# Patient Record
Sex: MALE | Race: WHITE | Employment: OTHER
[De-identification: names, ages, dates, MRNs, and addresses within clinical notes are randomized per-mention and may not be internally consistent; named-entity substitution may affect disease eponyms.]

---

## 2022-07-28 ENCOUNTER — OFFICE VISIT (OUTPATIENT)
Dept: FAMILY MEDICINE CLINIC | Facility: CLINIC | Age: 62
End: 2022-07-28
Payer: OTHER GOVERNMENT

## 2022-07-28 ENCOUNTER — PATIENT MESSAGE (OUTPATIENT)
Dept: FAMILY MEDICINE CLINIC | Facility: CLINIC | Age: 62
End: 2022-07-28

## 2022-07-28 VITALS
DIASTOLIC BLOOD PRESSURE: 70 MMHG | HEIGHT: 73 IN | TEMPERATURE: 98.9 F | WEIGHT: 228 LBS | BODY MASS INDEX: 30.22 KG/M2 | OXYGEN SATURATION: 97 % | SYSTOLIC BLOOD PRESSURE: 130 MMHG | HEART RATE: 73 BPM

## 2022-07-28 DIAGNOSIS — Z79.899 MEDICATION MANAGEMENT: Primary | ICD-10-CM

## 2022-07-28 DIAGNOSIS — Z23 ENCOUNTER FOR HERPES ZOSTER VACCINATION: ICD-10-CM

## 2022-07-28 DIAGNOSIS — Z00.00 ENCNTR FOR GENERAL ADULT MEDICAL EXAM W/O ABNORMAL FINDINGS: ICD-10-CM

## 2022-07-28 DIAGNOSIS — J44.9 CHRONIC OBSTRUCTIVE PULMONARY DISEASE, UNSPECIFIED COPD TYPE (HCC): ICD-10-CM

## 2022-07-28 DIAGNOSIS — M43.10 SPONDYLOLISTHESIS, UNSPECIFIED SPINAL REGION: Primary | ICD-10-CM

## 2022-07-28 DIAGNOSIS — M62.838 MUSCLE SPASM: ICD-10-CM

## 2022-07-28 PROBLEM — M17.11 OSTEOARTHRITIS OF RIGHT KNEE: Status: ACTIVE | Noted: 2021-11-30

## 2022-07-28 PROBLEM — N41.0 PROSTATITIS, ACUTE: Status: ACTIVE | Noted: 2018-07-06

## 2022-07-28 PROBLEM — M25.522 LEFT ELBOW PAIN: Status: ACTIVE | Noted: 2018-09-20

## 2022-07-28 PROBLEM — M79.672 LEFT FOOT PAIN: Status: ACTIVE | Noted: 2018-09-20

## 2022-07-28 PROBLEM — G89.29 CHRONIC LOW BACK PAIN WITHOUT SCIATICA: Status: ACTIVE | Noted: 2018-09-20

## 2022-07-28 PROBLEM — M54.50 CHRONIC LOW BACK PAIN WITHOUT SCIATICA: Status: ACTIVE | Noted: 2018-09-20

## 2022-07-28 PROBLEM — R73.03 PREDIABETES: Status: ACTIVE | Noted: 2019-09-27

## 2022-07-28 PROBLEM — D36.13 NEUROMA OF FOOT: Status: ACTIVE | Noted: 2021-11-21

## 2022-07-28 LAB
ALBUMIN SERPL-MCNC: 4 G/DL (ref 3.2–4.6)
ALBUMIN/GLOB SERPL: 1.1 {RATIO} (ref 1.2–3.5)
ALP SERPL-CCNC: 62 U/L (ref 50–136)
ALT SERPL-CCNC: 31 U/L (ref 12–65)
ANION GAP SERPL CALC-SCNC: 7 MMOL/L (ref 7–16)
AST SERPL-CCNC: 21 U/L (ref 15–37)
BASOPHILS # BLD: 0.1 K/UL (ref 0–0.2)
BASOPHILS NFR BLD: 1 % (ref 0–2)
BILIRUB SERPL-MCNC: 0.3 MG/DL (ref 0.2–1.1)
BUN SERPL-MCNC: 24 MG/DL (ref 8–23)
CALCIUM SERPL-MCNC: 9.3 MG/DL (ref 8.3–10.4)
CHLORIDE SERPL-SCNC: 111 MMOL/L (ref 98–107)
CO2 SERPL-SCNC: 25 MMOL/L (ref 21–32)
CREAT SERPL-MCNC: 1.1 MG/DL (ref 0.8–1.5)
DIFFERENTIAL METHOD BLD: ABNORMAL
EOSINOPHIL # BLD: 0.2 K/UL (ref 0–0.8)
EOSINOPHIL NFR BLD: 3 % (ref 0.5–7.8)
ERYTHROCYTE [DISTWIDTH] IN BLOOD BY AUTOMATED COUNT: 12.8 % (ref 11.9–14.6)
GLOBULIN SER CALC-MCNC: 3.5 G/DL (ref 2.3–3.5)
GLUCOSE SERPL-MCNC: 106 MG/DL (ref 65–100)
HCT VFR BLD AUTO: 41.4 % (ref 41.1–50.3)
HGB BLD-MCNC: 13.4 G/DL (ref 13.6–17.2)
IMM GRANULOCYTES # BLD AUTO: 0 K/UL (ref 0–0.5)
IMM GRANULOCYTES NFR BLD AUTO: 0 % (ref 0–5)
LYMPHOCYTES # BLD: 1.5 K/UL (ref 0.5–4.6)
LYMPHOCYTES NFR BLD: 22 % (ref 13–44)
MCH RBC QN AUTO: 32.4 PG (ref 26.1–32.9)
MCHC RBC AUTO-ENTMCNC: 32.4 G/DL (ref 31.4–35)
MCV RBC AUTO: 100.2 FL (ref 79.6–97.8)
MONOCYTES # BLD: 0.5 K/UL (ref 0.1–1.3)
MONOCYTES NFR BLD: 8 % (ref 4–12)
NEUTS SEG # BLD: 4.5 K/UL (ref 1.7–8.2)
NEUTS SEG NFR BLD: 66 % (ref 43–78)
NRBC # BLD: 0 K/UL (ref 0–0.2)
PLATELET # BLD AUTO: 202 K/UL (ref 150–450)
PMV BLD AUTO: 10.8 FL (ref 9.4–12.3)
POTASSIUM SERPL-SCNC: 3.9 MMOL/L (ref 3.5–5.1)
PROT SERPL-MCNC: 7.5 G/DL (ref 6.3–8.2)
RBC # BLD AUTO: 4.13 M/UL (ref 4.23–5.6)
SODIUM SERPL-SCNC: 143 MMOL/L (ref 136–145)
WBC # BLD AUTO: 6.8 K/UL (ref 4.3–11.1)

## 2022-07-28 PROCEDURE — 99386 PREV VISIT NEW AGE 40-64: CPT | Performed by: NURSE PRACTITIONER

## 2022-07-28 RX ORDER — CYCLOBENZAPRINE HCL 10 MG
10 TABLET ORAL 2 TIMES DAILY PRN
Qty: 14 TABLET | Refills: 0 | Status: SHIPPED | OUTPATIENT
Start: 2022-07-28

## 2022-07-28 RX ORDER — IBUPROFEN 800 MG/1
800 TABLET ORAL EVERY 8 HOURS PRN
Qty: 90 TABLET | Refills: 0 | Status: SHIPPED | COMMUNITY
Start: 2022-07-28 | End: 2022-07-29 | Stop reason: SDUPTHER

## 2022-07-28 RX ORDER — ALBUTEROL SULFATE 90 UG/1
2 AEROSOL, METERED RESPIRATORY (INHALATION) EVERY 6 HOURS PRN
Qty: 18 G | Refills: 2 | Status: SHIPPED | OUTPATIENT
Start: 2022-07-28

## 2022-07-28 RX ORDER — ALBUTEROL SULFATE 90 UG/1
2 AEROSOL, METERED RESPIRATORY (INHALATION) EVERY 6 HOURS PRN
COMMUNITY
Start: 2021-11-19 | End: 2022-07-28 | Stop reason: SDUPTHER

## 2022-07-28 RX ORDER — IBUPROFEN 800 MG/1
800 TABLET ORAL EVERY 8 HOURS PRN
COMMUNITY
Start: 2021-12-15 | End: 2022-07-28 | Stop reason: DRUGHIGH

## 2022-07-28 RX ORDER — CYCLOBENZAPRINE HCL 10 MG
10 TABLET ORAL 2 TIMES DAILY PRN
COMMUNITY
Start: 2021-11-11 | End: 2022-07-28 | Stop reason: SDUPTHER

## 2022-07-28 RX ORDER — ZOSTER VACCINE RECOMBINANT, ADJUVANTED 50 MCG/0.5
0.5 KIT INTRAMUSCULAR SEE ADMIN INSTRUCTIONS
Qty: 0.5 ML | Refills: 0 | Status: SHIPPED | OUTPATIENT
Start: 2022-07-28 | End: 2023-01-24

## 2022-07-28 SDOH — ECONOMIC STABILITY: FOOD INSECURITY: WITHIN THE PAST 12 MONTHS, THE FOOD YOU BOUGHT JUST DIDN'T LAST AND YOU DIDN'T HAVE MONEY TO GET MORE.: NEVER TRUE

## 2022-07-28 SDOH — ECONOMIC STABILITY: TRANSPORTATION INSECURITY
IN THE PAST 12 MONTHS, HAS THE LACK OF TRANSPORTATION KEPT YOU FROM MEDICAL APPOINTMENTS OR FROM GETTING MEDICATIONS?: NO

## 2022-07-28 SDOH — ECONOMIC STABILITY: FOOD INSECURITY: WITHIN THE PAST 12 MONTHS, YOU WORRIED THAT YOUR FOOD WOULD RUN OUT BEFORE YOU GOT MONEY TO BUY MORE.: NEVER TRUE

## 2022-07-28 SDOH — ECONOMIC STABILITY: TRANSPORTATION INSECURITY
IN THE PAST 12 MONTHS, HAS LACK OF TRANSPORTATION KEPT YOU FROM MEETINGS, WORK, OR FROM GETTING THINGS NEEDED FOR DAILY LIVING?: NO

## 2022-07-28 ASSESSMENT — PATIENT HEALTH QUESTIONNAIRE - PHQ9
1. LITTLE INTEREST OR PLEASURE IN DOING THINGS: 0
SUM OF ALL RESPONSES TO PHQ QUESTIONS 1-9: 0
SUM OF ALL RESPONSES TO PHQ QUESTIONS 1-9: 0
SUM OF ALL RESPONSES TO PHQ9 QUESTIONS 1 & 2: 0
SUM OF ALL RESPONSES TO PHQ QUESTIONS 1-9: 0
SUM OF ALL RESPONSES TO PHQ QUESTIONS 1-9: 0
2. FEELING DOWN, DEPRESSED OR HOPELESS: 0

## 2022-07-28 ASSESSMENT — ENCOUNTER SYMPTOMS
VOMITING: 0
COUGH: 0
ABDOMINAL PAIN: 0
RHINORRHEA: 1
SHORTNESS OF BREATH: 1
CONSTIPATION: 0
WHEEZING: 0
NAUSEA: 0
EYES NEGATIVE: 1
BACK PAIN: 0
TROUBLE SWALLOWING: 0
SORE THROAT: 0
DIARRHEA: 0
BLOOD IN STOOL: 0

## 2022-07-28 ASSESSMENT — SOCIAL DETERMINANTS OF HEALTH (SDOH): HOW HARD IS IT FOR YOU TO PAY FOR THE VERY BASICS LIKE FOOD, HOUSING, MEDICAL CARE, AND HEATING?: NOT HARD AT ALL

## 2022-07-28 ASSESSMENT — LIFESTYLE VARIABLES
HOW MANY STANDARD DRINKS CONTAINING ALCOHOL DO YOU HAVE ON A TYPICAL DAY: 1 OR 2
HOW OFTEN DO YOU HAVE A DRINK CONTAINING ALCOHOL: 2-3 TIMES A WEEK

## 2022-07-28 NOTE — PROGRESS NOTES
Camron Mario is a 59 y.o. male who presents today for the following:  Chief Complaint   Patient presents with    New Patient    Establish Care       Allergies   Allergen Reactions    Penicillins Anaphylaxis     Childhood allergy per patient severe reaction  Childhood allergy per patient severe reaction         Current Outpatient Medications   Medication Sig Dispense Refill    zoster recombinant adjuvanted vaccine (SHINGRIX) 50 MCG/0.5ML SUSR injection Inject 0.5 mLs into the muscle See Admin Instructions 1 dose now for second dose. 0.5 mL 0    albuterol sulfate HFA (PROVENTIL;VENTOLIN;PROAIR) 108 (90 Base) MCG/ACT inhaler Inhale 2 puffs into the lungs every 6 hours as needed for Wheezing or Shortness of Breath 18 g 2    cyclobenzaprine (FLEXERIL) 10 MG tablet Take 1 tablet by mouth 2 times daily as needed for Muscle spasms 14 tablet 0    ibuprofen (ADVIL;MOTRIN) 800 MG tablet Take 1 tablet by mouth every 8 hours as needed for Pain Take sparingly and with food. 90 tablet 0     No current facility-administered medications for this visit.        Past Medical History:   Diagnosis Date    Benign prostatic hyperplasia with weak urinary stream     Chronic pain of left knee     Chronic pain of right knee     COPD (chronic obstructive pulmonary disease) (HCC)     Hearing loss     Kidney stone     Kidney stone     2018 passes spontaneously    PAC (premature atrial contraction)     Prostatitis     x2 episodes, previously evaluated by urology    PVC (premature ventricular contraction)     Tinnitus        Past Surgical History:   Procedure Laterality Date    COLONOSCOPY      JOINT REPLACEMENT Right     November 2021    SINUS ENDOSCOPY      SPINE SURGERY      2015 L5 and S1       Social History     Tobacco Use    Smoking status: Former     Years: 1.00     Types: Cigarettes    Smokeless tobacco: Never    Tobacco comments:     Quit smoking 40 years ago   Substance Use Topics    Alcohol use: Yes     Comment: 2-3 times a week 2 drinks        Family History   Problem Relation Age of Onset    Cancer Mother         gyn cancer    No Known Problems Father     No Known Problems Sister     No Known Problems Brother        HPI   Patient presents new to me and the practice to establish care. He recently moved from [place], [place] accompanied by his wife to be closer to family. He is ex [employer] previously served in [employer]. He does report history of COPD and sinus problems as a result to inhaling sand. \"Lost 25% of lungs\". He does use inhaler occasionally and is on Advair prn. He does report history of prostatitis with enlarged prostate. He has hearing loss and tinnitus from the [employer]. He reports he had previously had a recent labs last year with PSA and lipids included closer to a year ago. He had total right knee replacement November 2021 with Dr. Bry Mora. He is currently seeing Dr. Beatriz Lara with [facility] in preparation for left knee surgery. He reports previous spinal surgery. His main concern is getting refills on his ibuprofen. He reports significant knee pain that is not responding to 400 to 600 mg ibuprofen, topical Voltaren, or Tylenol. He reports previous provider within [facility] recent labs close to a year. Review of Systems   Constitutional:  Negative for chills, diaphoresis, fatigue, fever and unexpected weight change. HENT:  Positive for hearing loss, rhinorrhea and tinnitus. Negative for dental problem, mouth sores, nosebleeds, sore throat and trouble swallowing. Constant light drainage. Eyes: Negative. Wears glasses, no contacts. Respiratory:  Positive for shortness of breath. Negative for cough and wheezing. Going up and downstairs. Cardiovascular:  Positive for palpitations. Negative for chest pain and leg swelling. PVC/PAC 1980s on holter monitor 2 weeks.    Gastrointestinal:  Negative for abdominal pain, blood in stool, Normal breath sounds. Abdominal:      General: Bowel sounds are normal. There is no distension. Palpations: Abdomen is soft. Tenderness: There is no abdominal tenderness. Musculoskeletal:         General: Normal range of motion. Cervical back: Normal range of motion and neck supple. No rigidity or tenderness. Right lower leg: No edema. Left lower leg: No edema. Lymphadenopathy:      Cervical: No cervical adenopathy. Skin:     General: Skin is warm and dry. Coloration: Skin is not jaundiced or pale. Neurological:      General: No focal deficit present. Mental Status: He is alert and oriented to person, place, and time. Psychiatric:         Mood and Affect: Mood normal.         Behavior: Behavior normal.         Thought Content: Thought content normal.         Judgment: Judgment normal.        1. Medication management  -     Comprehensive Metabolic Panel; Future  -     CBC with Auto Differential; Future  2. Encntr for general adult medical exam w/o abnormal findings  -     Comprehensive Metabolic Panel; Future  -     CBC with Auto Differential; Future  3. Encounter for herpes zoster vaccination  -     zoster recombinant adjuvanted vaccine [facility]) 50 MCG/0.5ML SUSR injection; Inject 0.5 mLs into the muscle See Admin Instructions 1 dose now for second dose., Disp-0.5 mL, R-0Normal  4. Chronic obstructive pulmonary disease, unspecified COPD type (HCC)  -     albuterol sulfate HFA (PROVENTIL;VENTOLIN;PROAIR) 108 (90 Base) MCG/ACT inhaler; Inhale 2 puffs into the lungs every 6 hours as needed for Wheezing or Shortness of Breath, Disp-18 g, R-2Normal  5. Muscle spasm  -     cyclobenzaprine (FLEXERIL) 10 MG tablet; Take 1 tablet by mouth 2 times daily as needed for Muscle spasms, Disp-14 tablet, R-0Normal   Health maintenance:  Denies previous HTN dx. Declines PSA and lipid as reports recently had completed.   Encouraged regular exercise, DASH diet, diet low in saturated fat/cholesterol, and vit d/calcium rich diet. Cologuard  6 months 2021 ago clear, colonoscopy at 48 clear. Need second shingles vaccine, sent to his pharmacy the Rx; declines COVID-19 and pneumonia vaccine. Declines Hep C and HIV screening. Pt drinks in moderation, encouraged to continue no more than 2 drinks daily/14 per week. He does not smoke  He is encouraged to maintain his follow up with dentist and ophthalmology. Old records requested for review. Discussed with patient risks of NSAID induced gastric ulcers with long-term use and recommend using ibuprofen sparingly and if takes regularly to consider PPI over-the-counter to take regularly. Discussed with patient also regularly use can affect the kidneys. We will continue to monitor. Discussed with patient recommend close monitoring for any bloody or black stools. Patient informed, we will call with blood work results within one week. If you have not heard regarding results in over a week, please contact office. You can also [facility]. Follow-up and Dispositions    Return in about 6 months (around 1/28/2023).      Claudell Echevaria, APRN - CNP

## 2022-07-28 NOTE — PATIENT INSTRUCTIONS
Patient Education        Learning About the Mediterranean Diet  What is the [address]? The Mediterranean diet is a style of eating rather than a diet plan. It features foods eaten in [place], [place], [place] and [place], and other countries along the [facility]. It emphasizes eating foods like fish, fruits, vegetables, beans, high-fiber breads and whole grains, nuts, and oliveoil. This style of eating includes limited red meat, cheese, and sweets. Why choose the Mediterranean diet? A Mediterranean-style diet may improve heart health. It contains more fat than other heart-healthy diets. But the fats are mainly from nuts, unsaturated oils (such as fish oils and olive oil), and certain nut or seed oils (such as canola, soybean, or flaxseed oil). These fats may help protect the heart andblood vessels. How can you get started on the Mediterranean diet? Here are some things you can do to switch to a more Mediterranean way of eating. What to eat  Eat a variety of fruits and vegetables each day, such as grapes, blueberries, tomatoes, broccoli, peppers, figs, olives, spinach, eggplant, beans, lentils, and chickpeas. Eat a variety of whole-grain foods each day, such as oats, brown rice, and whole wheat bread, pasta, and couscous. Eat fish at least 2 times a week. Try tuna, salmon, mackerel, lake trout, herring, or sardines. Eat moderate amounts of low-fat dairy products, such as milk, cheese, or yogurt. Eat moderate amounts of poultry and eggs. Choose healthy (unsaturated) fats, such as nuts, olive oil, and certain nut or seed oils like canola, soybean, and flaxseed. Limit unhealthy (saturated) fats, such as butter, palm oil, and coconut oil. And limit fats found in animal products, such as meat and dairy products made with whole milk. Try to eat red meat only a few times a month in very small amounts. Limit sweets and desserts to only a few times a week.  This includes sugar-sweetened drinks like soda. The Mediterranean diet may also include red wine with your meal--1 glass eachday for women and up to 2 glasses a day for men. Tips for eating at home  Use herbs, spices, garlic, lemon zest, and citrus juice instead of salt to add flavor to foods. Add avocado slices to your sandwich instead of seaman. Have fish for lunch or dinner instead of red meat. Brush the fish with olive oil, and broil or grill it. Sprinkle your salad with seeds or nuts instead of cheese. Cook with olive or canola oil instead of butter or oils that are high in saturated fat. Switch from 2% milk or whole milk to 1% or fat-free milk. Dip raw vegetables in a vinaigrette dressing or hummus instead of dips made from mayonnaise or sour cream.  Have a piece of fruit for dessert instead of a piece of cake. Try baked apples, or have some dried fruit. Tips for eating out  Try broiled, grilled, baked, or poached fish instead of having it fried or breaded. Ask your  to have your meals prepared with olive oil instead of butter. Order dishes made with marinara sauce or sauces made from olive oil. Avoid sauces made from cream or mayonnaise. Choose whole-grain breads, whole wheat pasta and pizza crust, brown rice, beans, and lentils. Cut back on butter or margarine on bread. Instead, you can dip your bread in a small amount of olive oil. Ask for a side salad or grilled vegetables instead of french fries or chips. Where can you learn more? Go to https://Modern Guildlorrieeb.[employer]. org and sign in to your [employer] account. Enter [phone] in the formerly [facility] box to learn more about \"Learning About the Mediterranean Diet. \"     If you do not have an account, please click on the \"Sign Up Now\" link. Current as of: September 8, 2021               Content Version: 13.3  © 5276-2644 Healthwise, Incorporated. Care instructions adapted under license by [facility] [place] ([facility]).  If you have questions about a medical condition or this instruction, always ask your healthcare professional. [address] any warranty or liability for your use of this information. Patient Education        DASH Diet: Care Instructions  Your Care Instructions     The DASH diet is an eating plan that can help lower your blood pressure. DASH stands for Dietary Approaches to Stop Hypertension. Hypertension is high bloodpressure. The DASH diet focuses on eating foods that are high in calcium, potassium, and magnesium. These nutrients can lower blood pressure. The foods that are highest in these nutrients are fruits, vegetables, low-fat dairy products, nuts, seeds, and legumes. But taking calcium, potassium, and magnesium supplements instead of eating foods that are high in those nutrients does not have the same effect. The DASH diet also includes whole grains, fish, and poultry. The DASH diet is one of several lifestyle changes your doctor may recommend to lower your high blood pressure. Your doctor may also want you to decrease the amount of sodium in your diet. Lowering sodium while following the DASH dietcan lower blood pressure even further than just the DASH diet alone. Follow-up care is a key part of your treatment and safety. Be sure to make and go to all appointments, and call your doctor if you are having problems. It's also a good idea to know your test results and keep alist of the medicines you take. How can you care for yourself at home? Following the DASH diet  Eat 4 to 5 servings of fruit each day. A serving is 1 medium-sized piece of fruit, ½ cup chopped or canned fruit, 1/4 cup dried fruit, or 4 ounces (½ cup) of fruit juice. Choose fruit more often than fruit juice. Eat 4 to 5 servings of vegetables each day. A serving is 1 cup of lettuce or raw leafy vegetables, ½ cup of chopped or cooked vegetables, or 4 ounces (½ cup) of vegetable juice. Choose vegetables more often than vegetable juice.   Get 2 to 3 servings of low-fat and fat-free dairy each day. A serving is 8 ounces of milk, 1 cup of yogurt, or 1 ½ ounces of cheese. Eat 6 to 8 servings of grains each day. A serving is 1 slice of bread, 1 ounce of dry cereal, or ½ cup of cooked rice, pasta, or cooked cereal. Try to choose whole-grain products as much as possible. Limit lean meat, poultry, and fish to 2 servings each day. A serving is 3 ounces, about the size of a deck of cards. Eat 4 to 5 servings of nuts, seeds, and legumes (cooked dried beans, lentils, and split peas) each week. A serving is 1/3 cup of nuts, 2 tablespoons of seeds, or ½ cup of cooked beans or peas. Limit fats and oils to 2 to 3 servings each day. A serving is 1 teaspoon of vegetable oil or 2 tablespoons of salad dressing. Limit sweets and added sugars to 5 servings or less a week. A serving is 1 tablespoon jelly or jam, ½ cup sorbet, or 1 cup of lemonade. Eat less than 2,300 milligrams (mg) of sodium a day. If you limit your sodium to 1,500 mg a day, you can lower your blood pressure even more. Be aware that all of these are the suggested number of servings for people who eat 1,800 to 2,000 calories a day. Your recommended number of servings may be different if you need more or fewer calories. Tips for success  Start small. Do not try to make dramatic changes to your diet all at once. You might feel that you are missing out on your favorite foods and then be more likely to not follow the plan. Make small changes, and stick with them. Once those changes become habit, add a few more changes. Try some of the following:  Make it a goal to eat a fruit or vegetable at every meal and at snacks. This will make it easy to get the recommended amount of fruits and vegetables each day. Try yogurt topped with fruit and nuts for a snack or healthy dessert. Add lettuce, tomato, cucumber, and onion to sandwiches. Combine a ready-made pizza crust with low-fat mozzarella cheese and lots of vegetable toppings.  Try using tomatoes, squash, spinach, broccoli, carrots, cauliflower, and onions. Have a variety of cut-up vegetables with a low-fat dip as an appetizer instead of chips and dip. Sprinkle sunflower seeds or chopped almonds over salads. Or try adding chopped walnuts or almonds to cooked vegetables. Try some vegetarian meals using beans and peas. Add garbanzo or kidney beans to salads. Make burritos and tacos with mashed bell beans or black beans. Where can you learn more? Go to https://Revelenspepiceweb.[employer]. org and sign in to your [employer] account. Enter I495 in the [employer] box to learn more about \"DASH Diet: Care Instructions. \"     If you do not have an account, please click on the \"Sign Up Now\" link. Current as of: January 10, 2022               Content Version: 13.3  © 2006-2022 Healthwise, Incorporated. Care instructions adapted under license by [facility] ([facility]). If you have questions about a medical condition or this instruction, always ask your healthcare professional. [place] [address] any warranty or liability for your use of this information.

## 2022-07-29 RX ORDER — IBUPROFEN 800 MG/1
800 TABLET ORAL EVERY 8 HOURS PRN
Qty: 90 TABLET | Refills: 0 | Status: SHIPPED | OUTPATIENT
Start: 2022-07-29

## 2022-07-29 NOTE — TELEPHONE ENCOUNTER
Diagnoses and all orders for this visit:    Spondylolisthesis, unspecified spinal region  -     ibuprofen (ADVIL;MOTRIN) 800 MG tablet; Take 1 tablet by mouth every 8 hours as needed for Pain Take sparingly and with food.

## 2022-07-29 NOTE — RESULT ENCOUNTER NOTE
His labs overall look good. His hemoglobin remains slightly low. Reviewing his records, it appears he has had very slight decrease in hemoglobin since the end of last year. His red blood cells are large in size which could indicate possible vitamin D05 or folic acid deficiency. It is recommended that he intakes enough food sources with vitamin W97 and folic acid. Please send him a diet handout if he needs this for the food sources. He should monitor for GI bleeding and report any bloody or black stools. We will recheck his labs when he comes in for 6 month check. It is possible his anemia could be from his recent surgery back in November. I do not see a lipid panel done recently. It appears his last lipid panel was normal back in 2020. We can check his fasting lipid panel at his next visit if he desires. I was unable to see the results for his Cologuard and his PSA in his record. Please see if we can obtain records from his urologist that he saw as well as obtain those Cologuard results.

## 2022-07-29 NOTE — TELEPHONE ENCOUNTER
From: Tasneem Alarcon  To: Maria Elena Membreno  Sent: 7/28/2022 7:42 PM EDT  Subject:  perscription    The pharmacy did not get the request to fill my prescription for [id number].  Was it sent in?

## 2022-08-08 ENCOUNTER — TELEPHONE (OUTPATIENT)
Dept: FAMILY MEDICINE CLINIC | Facility: CLINIC | Age: 62
End: 2022-08-08

## 2022-08-08 DIAGNOSIS — H91.90 HEARING LOSS, UNSPECIFIED HEARING LOSS TYPE, UNSPECIFIED LATERALITY: Primary | ICD-10-CM

## 2022-08-24 ENCOUNTER — OFFICE VISIT (OUTPATIENT)
Dept: ENT CLINIC | Age: 62
End: 2022-08-24
Payer: OTHER GOVERNMENT

## 2022-08-24 VITALS
HEIGHT: 73 IN | SYSTOLIC BLOOD PRESSURE: 128 MMHG | WEIGHT: 228 LBS | DIASTOLIC BLOOD PRESSURE: 72 MMHG | BODY MASS INDEX: 30.22 KG/M2

## 2022-08-24 DIAGNOSIS — H83.3X3 NOISE-INDUCED HEARING LOSS OF BOTH EARS: ICD-10-CM

## 2022-08-24 DIAGNOSIS — H90.3 SENSORINEURAL HEARING LOSS, BILATERAL: Primary | ICD-10-CM

## 2022-08-24 DIAGNOSIS — H93.13: ICD-10-CM

## 2022-08-24 DIAGNOSIS — H90.3 SENSORINEURAL HEARING LOSS (SNHL) OF BOTH EARS: Primary | ICD-10-CM

## 2022-08-24 PROCEDURE — 92567 TYMPANOMETRY: CPT | Performed by: AUDIOLOGIST

## 2022-08-24 PROCEDURE — 92557 COMPREHENSIVE HEARING TEST: CPT | Performed by: AUDIOLOGIST

## 2022-08-24 PROCEDURE — 99203 OFFICE O/P NEW LOW 30 MIN: CPT | Performed by: STUDENT IN AN ORGANIZED HEALTH CARE EDUCATION/TRAINING PROGRAM

## 2022-08-24 ASSESSMENT — ENCOUNTER SYMPTOMS
ABDOMINAL PAIN: 0
SHORTNESS OF BREATH: 0

## 2022-08-24 NOTE — PROGRESS NOTES
HPI:  Kaci Meyer is a 59 y.o. male seen New    Chief Complaint   Patient presents with    Hearing Problem     Patient presents today with c/o tinnitus and decrease in ability to hear that he attributes to [employer]. Patient presents as a new patient referral evaluation with complaint of hearing loss. He states having had 30+ years experience in [employer] in the [employer] during which time he had relatively chronic loud noise exposure around loud engines and machinery and firearms. Ever since that time into adulthood he has had at least 20+ years of known hearing loss and hearing changes and tinnitus. His tinnitus has gotten to the point where it is now continuously present at all times and he is having significant dysfunction with hearing to where he is having trouble hearing conversations in noisy environments and particularly worsening understanding soft female voices. Otherwise he denies any previous ear complaints including no ear infections or ear surgeries and he denies any otorrhea otalgia dizziness or vertigo. Past Medical History, Past Surgical History, Family history, Social History, and Medications were all reviewed with the patient today and updated as necessary. Allergies   Allergen Reactions    Penicillins Anaphylaxis     Childhood allergy per patient severe reaction  Childhood allergy per patient severe reaction         Patient Active Problem List   Diagnosis    Chronic low back pain without sciatica    Left elbow pain    Left foot pain    Neuroma of foot    Obstructive lung disease (generalized) (HCC)    Osteoarthritis of right knee    Prediabetes    Prostatitis, acute    Spondylolisthesis       Current Outpatient Medications   Medication Sig    ibuprofen (ADVIL;MOTRIN) 800 MG tablet Take 1 tablet by mouth every 8 hours as needed for Pain Take sparingly and with food.     Fluticasone-Salmeterol (ADVAIR DISKUS IN) Inhale into the lungs As needed    zoster recombinant difficulty urinating. Musculoskeletal:  Negative for arthralgias. Skin:  Negative for rash. Allergic/Immunologic: Negative for environmental allergies. Neurological:  Negative for dizziness and headaches. Hematological:  Negative for adenopathy. Psychiatric/Behavioral:  Negative for agitation. PHYSICAL EXAM:    /72   Ht 6' 0.75\" (1.848 m)   Wt 228 lb (103.4 kg)   BMI 30.29 kg/m²     Physical Exam  Vitals and nursing note reviewed. Constitutional:       Appearance: Normal appearance. HENT:      Head: Normocephalic and atraumatic. Right Ear: Tympanic membrane, ear canal and external ear normal. There is no impacted cerumen. Tympanic membrane is not scarred, perforated, erythematous or retracted. Left Ear: Tympanic membrane, ear canal and external ear normal. There is no impacted cerumen. Tympanic membrane is not scarred, perforated, erythematous or retracted. Ears:      Comments: Bilateral TMs intact with no perforations retractions or middle ear effusions. Bilateral EACs patent with no inflammation or erythema or edema     Nose: Nose normal. No congestion or rhinorrhea. Mouth/Throat:      Mouth: Mucous membranes are moist.      Pharynx: Oropharynx is clear. No oropharyngeal exudate or posterior oropharyngeal erythema. Eyes:      General: No scleral icterus. Pulmonary:      Effort: Pulmonary effort is normal.   Musculoskeletal:      Cervical back: Normal range of motion and neck supple. No rigidity. Lymphadenopathy:      Cervical: No cervical adenopathy. Skin:     General: Skin is warm and dry. Neurological:      Mental Status: He is alert and oriented to person, place, and time. Psychiatric:         Mood and Affect: Mood normal.         Behavior: Behavior normal.            ASSESSMENT and PLAN        ICD-10-CM    1. Sensorineural hearing loss (SNHL) of both ears  H90.3       2. Noise induced tinnitus of both ears  H93.13       3.  Noise-induced hearing loss of both ears  H83.3X3           Both of his ears looked completely healthy on my exam today, but their audiogram revealed bilateral symmetrical normal sloping to mild sloping to severe SNHL with bilateral type a tympanograms and speech discrimination scores of 92% on the right and 80% on the left. I also reviewed some conservative measures for their underlying HL including reducing background noise and always facing the person talking to them. Will follow up PRN if hearing continues to interfere with quality of life to discuss need for hearing aids. His tinnitus has become more significant and continuous and we discussed that his tinnitus is in fact a side effect of his underlying sensorineural hearing loss. He can utilize some conservative masking strategies to help him cope w/ the tinnitus. Hearing aids would be the treatment of choice for both his hearing loss and the associated tinnitus. Given unremarkable examination of the ears and severe SNHL with a likely under lining noise-induced hearing loss component he would be a good candidate for bilateral auditory amplification and [facility]. He can return to clinic with ENT if any future concerns.     Gray Shah, DO  8/25/2022

## 2022-09-01 DIAGNOSIS — J44.9 CHRONIC OBSTRUCTIVE PULMONARY DISEASE, UNSPECIFIED COPD TYPE (HCC): Primary | ICD-10-CM

## 2022-09-02 DIAGNOSIS — R20.0 NUMBNESS OF RIGHT LOWER EXTREMITY: Primary | ICD-10-CM

## 2022-09-02 DIAGNOSIS — R20.0 NUMBNESS OF RIGHT FOOT: ICD-10-CM

## 2022-09-02 DIAGNOSIS — J44.9 COPD, MODERATE (HCC): Primary | ICD-10-CM

## 2022-09-02 DIAGNOSIS — R20.0 RIGHT LEG NUMBNESS: ICD-10-CM

## 2022-09-27 ENCOUNTER — HOSPITAL ENCOUNTER (OUTPATIENT)
Dept: GENERAL RADIOLOGY | Age: 62
Discharge: HOME | End: 2022-09-30
Payer: OTHER GOVERNMENT

## 2022-09-27 ENCOUNTER — PREP FOR PROCEDURE (OUTPATIENT)
Dept: PULMONOLOGY | Age: 62
End: 2022-09-27

## 2022-09-27 ENCOUNTER — OFFICE VISIT (OUTPATIENT)
Dept: PULMONOLOGY | Age: 62
End: 2022-09-27
Payer: OTHER GOVERNMENT

## 2022-09-27 VITALS
TEMPERATURE: 97.2 F | OXYGEN SATURATION: 98 % | HEART RATE: 68 BPM | WEIGHT: 232 LBS | SYSTOLIC BLOOD PRESSURE: 136 MMHG | BODY MASS INDEX: 29.77 KG/M2 | RESPIRATION RATE: 14 BRPM | HEIGHT: 74 IN | DIASTOLIC BLOOD PRESSURE: 86 MMHG

## 2022-09-27 DIAGNOSIS — J98.8 AIRWAY OBSTRUCTION: ICD-10-CM

## 2022-09-27 DIAGNOSIS — R06.09 DYSPNEA ON EXERTION: ICD-10-CM

## 2022-09-27 DIAGNOSIS — J44.9 CHRONIC OBSTRUCTIVE PULMONARY DISEASE, UNSPECIFIED COPD TYPE (HCC): Primary | ICD-10-CM

## 2022-09-27 DIAGNOSIS — J44.9 COPD, MODERATE (HCC): ICD-10-CM

## 2022-09-27 PROBLEM — R94.2 ABNORMAL PULMONARY FUNCTION: Status: ACTIVE | Noted: 2022-09-27

## 2022-09-27 LAB
EXPIRATORY TIME: NORMAL
FEF 25-75% %PRED-PRE: NORMAL
FEF 25-75% PRED: NORMAL
FEF 25-75%-PRE: NORMAL
FEV1 %PRED-PRE: 65 %
FEV1 PRED: NORMAL
FEV1/FVC %PRED-PRE: NORMAL
FEV1/FVC PRED: NORMAL
FEV1/FVC: 57 %
FEV1: 2.6 L
FVC %PRED-PRE: 85 %
FVC PRED: NORMAL
FVC: 4.56 L
PEF %PRED-PRE: NORMAL
PEF PRED: NORMAL
PEF-PRE: NORMAL

## 2022-09-27 PROCEDURE — 94010 BREATHING CAPACITY TEST: CPT | Performed by: INTERNAL MEDICINE

## 2022-09-27 PROCEDURE — 71046 X-RAY EXAM CHEST 2 VIEWS: CPT

## 2022-09-27 PROCEDURE — 99205 OFFICE O/P NEW HI 60 MIN: CPT | Performed by: INTERNAL MEDICINE

## 2022-09-27 ASSESSMENT — PULMONARY FUNCTION TESTS
FEV1_PERCENT_PREDICTED_PRE: 65
FVC_PERCENT_PREDICTED_PRE: 85
FEV1: 2.60
FVC: 4.56
FEV1/FVC: 57

## 2022-09-27 NOTE — PROGRESS NOTES
[address] [facility]. [address], [address] [facility]  [phone]    Patient Name:  Tess Reyze  YOB: 1960      Office Visit 9/27/2022    CHIEF COMPLAINT:    Chief Complaint   Patient presents with    New Patient    COPD       HISTORY OF PRESENT ILLNESS:    This is a delightful 68-year-old white male with a pre-existing diagnosis of COPD who presents for establishment of care. Patient states that he was diagnosed with COPD since 2010 probably, he has been on inhalers Advair and albuterol for the past 6 years at least.  His smoking history is very trivial he smoked for about 2 years a pack a day and quit in 1980. He is a [employer] war  having done 2 tours in [employer] in the 90s. He denies any chest trauma neck trauma, denies any significant exposures. He notices dyspnea on more than usual exertion, he does not really report any wheezing. He reports that he was told that he has chronic bronchitis initially and then COPD. He denies any unintentional weight loss, he has chronic cough and the sensation of something stuck in his chest since 2010 that he cannot cough up. He is otherwise relatively healthy, he has arthritis with resultant knee replacements, chronic back pain, he also has history of nephrolithiasis. He also has history of premature ventricular contractions.       Past Medical History:   Diagnosis Date    Benign prostatic hyperplasia with weak urinary stream     Chronic pain of left knee     Chronic pain of right knee     COPD (chronic obstructive pulmonary disease) (HCC)     Hearing loss     Kidney stone     Kidney stone     2018 passes spontaneously    PAC (premature atrial contraction)     Prostatitis     x2 episodes, previously evaluated by urology    PVC (premature ventricular contraction)     Tinnitus          Patient Active Problem List   Diagnosis    Chronic low back pain without sciatica    Left elbow pain    Left foot pain    Neuroma of foot Obstructive lung disease (generalized) (HCC)    Osteoarthritis of right knee    Prediabetes    Prostatitis, acute    Spondylolisthesis    Right leg numbness    Numbness of right foot    Dyspnea on exertion    Airway obstruction           Past Surgical History:   Procedure Laterality Date    COLONOSCOPY      JOINT REPLACEMENT Right     2021    SINUS ENDOSCOPY      SPINE SURGERY      2015 L5 and S1         Social History     Socioeconomic History    Marital status:      Spouse name: Not on file    Number of children: Not on file    Years of education: Not on file    Highest education level: Not on file   Occupational History    Not on file   Tobacco Use    Smoking status: Former     Packs/day: 1.00     Years: 2.00     Pack years: 2.00     Types: Cigarettes     Quit date: 1980     Years since quittin.7    Smokeless tobacco: Never    Tobacco comments:     Quit smoking 40 years ago   Vaping Use    Vaping Use: Never used   Substance and Sexual Activity    Alcohol use: Yes     Comment: 2-3 times a week 2 drinks    Drug use: Never    Sexual activity: Not on file   Other Topics Concern    Not on file   Social History Narrative    Not on file     Social Determinants of Health     Financial Resource Strain: Low Risk     Difficulty of Paying Living Expenses: Not hard at all   Food Insecurity: No Food Insecurity    Worried About Running Out of Food in the Last Year: Never true    Ran Out of Food in the Last Year: Never true   Transportation Needs: No Transportation Needs    Lack of Transportation (Medical): No    Lack of Transportation (Non-Medical):  No   Physical Activity: Not on file   Stress: Not on file   Social Connections: Not on file   Intimate Partner Violence: Not on file   Housing Stability: Not on file         Family History   Problem Relation Age of Onset    Cancer Mother         gyn cancer    No Known Problems Father     No Known Problems Sister     No Known Problems Brother Allergies   Allergen Reactions    Penicillins Anaphylaxis     Childhood allergy per patient severe reaction  Childhood allergy per patient severe reaction           Current Outpatient Medications   Medication Sig    ibuprofen (ADVIL;MOTRIN) 800 MG tablet Take 1 tablet by mouth every 8 hours as needed for Pain Take sparingly and with food. Fluticasone-Salmeterol (ADVAIR DISKUS IN) Inhale into the lungs As needed    albuterol sulfate HFA (PROVENTIL;VENTOLIN;PROAIR) 108 (90 Base) MCG/ACT inhaler Inhale 2 puffs into the lungs every 6 hours as needed for Wheezing or Shortness of Breath    cyclobenzaprine (FLEXERIL) 10 MG tablet Take 1 tablet by mouth 2 times daily as needed for Muscle spasms    zoster recombinant adjuvanted vaccine (SHINGRIX) 50 MCG/0.5ML SUSR injection Inject 0.5 mLs into the muscle See Admin Instructions 1 dose now for second dose. (Patient not taking: Reported on 9/27/2022)     No current facility-administered medications for this visit. Review of Systems          PHYSICAL EXAM:    Vitals:    09/27/22 0853   BP: 136/86   Pulse: 68   Resp: 14   Temp: 97.2 °F (36.2 °C)   SpO2: 98%        GENERAL APPEARANCE:   The patient is normal weight and in no respiratory distress. HEENT:   PERRL. Conjunctivae unremarkable. Nasal mucosa is without epistaxis, exudate, or polyps. Gums and dentition are unremarkable. There is no oropharyngeal narrowing. TMs are clear. NECK/LYMPHATIC:   Symmetrical with no elevation of jugular venous pulsation. Trachea midline. No thyroid enlargement. No cervical adenopathy. LUNGS:   Normal respiratory effort with symmetrical lung expansion. Breath sounds clear to auscultation bilaterally with no rhonchi wheezing or crackles, stridor is not appreciated on forceful breathing. HEART:   There is a regular rate and rhythm. No murmur, rub, or gallop. There is no edema in the lower extremities. ABDOMEN:   Soft and non-tender. No hepatosplenomegaly. Bowel sounds are normal.     SKIN:   There are no rashes, cyanosis, jaundice, or ecchymosis present. EXTREMITIES:   The extremities are unremarkable without clubbing, cyanosis, joint inflammation, degenerative, or ischemic change. MUSCULOSKELETAL:   There is no abnormal tone, muscle atrophy, or abnormal movement present. NEURO:   The patient is alert and oriented to person, place, and time. Memory appears intact and mood is normal.  No gross sensorimotor deficits are present. DIAGNOSTIC TESTS:     CXR:     Spirometry:  9/27/2022           ASSESSMENT:  (Medical Decision Making)       Patient Active Problem List   Diagnosis    Chronic low back pain without sciatica    Left elbow pain    Left foot pain    Neuroma of foot    Obstructive lung disease (generalized) (HCC)    Osteoarthritis of right knee    Prediabetes    Prostatitis, acute    Spondylolisthesis    Right leg numbness    Numbness of right foot    Dyspnea on exertion    Airway obstruction           PLAN:  Encounter Diagnoses   Name Primary? Chronic obstructive pulmonary disease, unspecified COPD type ([facility] [address] Yes    Dyspnea on exertion     Airway obstruction    The patient presents with a pre-existing diagnosis of COPD, on Advair and albuterol what is unusual in his history is the fact that he has a very trivial smoking history having smoked for only 2 years about a pack per day and quit in 1980. In addition when analyzing the shape of the flow-volume loop: It becomes very evident that there is a suspicion for a variable intrathoracic airway obstruction or even a fixed airway obstruction based on this appearance and I am beginning to wonder if this has been the problem all along given the very trivial smoking history in this patient. I explained this and discussed this with the patient at length.   He does have moderate obstruction but the characteristic shape of the flow-volume loop indicates some sort of intrathoracic airway obstruction that needs to be excluded and therefore a bronchoscopy is in order here to both diagnose and possibly intervene with possible tracheal/bronchial dilation of a possibly identified obstruction. This does not appear to be dynamic airway collapse it does indeed have a very characteristic appearance of variable intrathoracic airway obstruction and that entity needs to be excluded. The patient is in agreement. For now he will continue with his Advair and albuterol, if the bronchoscopy is nondiagnostic and the airways look normal then we will proceed with CT scanning of the chest to look for signs of emphysema in this patient and if present and especially if present predominantly in the lower lobes perhaps an assessment for alpha 1 antitrypsin deficiency would be in order. Any questions asked were answered to the patient's satisfaction, risks and benefits of bronchoscopy explained, the need for possible dilation discussed with the patient and he is in agreement to proceed with procedure. Bronchoscopy will be arranged first available and follow-up in the office will be arranged in 1 month. Total time spent 60 minutes  Orders Placed This Encounter   Procedures    Spirometry Without Bronchodilator     Standing Status:   Future     Number of Occurrences:   1     Standing Expiration Date:   9/27/2023       No orders of the defined types were placed in this encounter. Tito Parekh MD    Dictated using voice recognition software.  Proofread, but unrecognized voice recognition errors may exist.

## 2022-09-28 ENCOUNTER — TELEPHONE (OUTPATIENT)
Dept: PULMONOLOGY | Age: 62
End: 2022-09-28

## 2022-09-28 NOTE — TELEPHONE ENCOUNTER
Patient says that Friday is not a good day for him to have the Bronchoscopy, he needs it rescheduled . The only other day that he will have a scheduling conflict with is October the 6th .

## 2022-10-03 ENCOUNTER — OFFICE VISIT (OUTPATIENT)
Dept: NEUROLOGY | Age: 62
End: 2022-10-03
Payer: OTHER GOVERNMENT

## 2022-10-03 VITALS
SYSTOLIC BLOOD PRESSURE: 150 MMHG | HEIGHT: 73 IN | WEIGHT: 230 LBS | DIASTOLIC BLOOD PRESSURE: 94 MMHG | HEART RATE: 78 BPM | BODY MASS INDEX: 30.48 KG/M2

## 2022-10-03 DIAGNOSIS — R20.0 NUMBNESS AND TINGLING OF RIGHT LEG: ICD-10-CM

## 2022-10-03 DIAGNOSIS — G60.8 SENSORY POLYNEUROPATHY: Primary | ICD-10-CM

## 2022-10-03 DIAGNOSIS — R20.2 PARESTHESIA OF BOTH FEET: ICD-10-CM

## 2022-10-03 DIAGNOSIS — G60.8 SENSORY POLYNEUROPATHY: ICD-10-CM

## 2022-10-03 DIAGNOSIS — R20.2 NUMBNESS AND TINGLING OF RIGHT LEG: ICD-10-CM

## 2022-10-03 PROCEDURE — 95886 MUSC TEST DONE W/N TEST COMP: CPT | Performed by: PSYCHIATRY & NEUROLOGY

## 2022-10-03 PROCEDURE — 95911 NRV CNDJ TEST 9-10 STUDIES: CPT | Performed by: PSYCHIATRY & NEUROLOGY

## 2022-10-03 NOTE — PROGRESS NOTES
[address], [address] [facility], [address] [place] [address]       Nerve Conduction Study and Electromyogram Report           Hx: 59 y.o. male with complaint of numbness in the right lower leg lateral aspect and in feet R>L. Symptoms have been present for 7 years  Significant past medical history includes borderline Diabetes 3 years. Drinks wine. LS surgery for R L5 in 2015. Clinical summary was reviewed. Neurological examination: Motor power showed weakness in the right EHL and mildly weak in the right proneus longus, the rest was 5/5 bilateral lower limb. There was no atrophy. There were no fasciculations. Deep tendon reflexes were present and symmetrical at 2+, toe sign down going bilaterally. Light touch reduced in the lateral aspect of the right lower leg, normal in soles. Pinprick was normal. Gait normal.         Description: Nerve conduction studies were performed on the right lower extremity and left lower extremity, using standard technique. Bilateral sural amplitudes slightly reduced 7.3 right and 8.7 µV left, CV normal bilaterally. Bilateral plantar nerve showed no response. Peroneal distal latency borderline, amplitude reduced to 0.6 mV, CV normal; left distal latency normal range, amplitude borderline normal 2.8/3.0/0 mV, CV normal.  Right tibial motor was normal; left skipped. Right peroneal segmental study was normal.  F-wave latencies right peroneal relatively prolonged 59.0 MS, the rest 55. H reflex response was poorly formed both sides. Needle electromyography was performed on the right lower extremity, using standard concentric electrodes. Results were normal including tibialis anterior, proneus longus gastrocnemius, rectus femoris and extensor hallucis longus. 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  2) reduced right peroneal motor amplitude and relatively prolonged F-wave latency likely resulted from pre-existing L5 radiculopathy. Needle EMG to the right lower limb was normal showing no evidence of femoral/sciatic/ peroneal neuropathy, L3/4/5/S1 radiculopathy or plexopathy. No myopathic process. Educational material of polyneuropathy was provided to the patient. He will complete neuropathy lab screening.           Procedure Details: Under procedure category          Faviola Voss MD

## 2022-10-04 LAB
EST. AVERAGE GLUCOSE BLD GHB EST-MCNC: 131 MG/DL
HBA1C MFR BLD: 6.2 % (ref 4.8–5.6)
T4 FREE SERPL-MCNC: 0.8 NG/DL (ref 0.78–1.46)
VIT B12 SERPL-MCNC: 609 PG/ML (ref 193–986)

## 2022-10-06 DIAGNOSIS — M54.40 CHRONIC LOW BACK PAIN WITH SCIATICA, SCIATICA LATERALITY UNSPECIFIED, UNSPECIFIED BACK PAIN LATERALITY: ICD-10-CM

## 2022-10-06 DIAGNOSIS — R20.0 NUMBNESS OF RIGHT LOWER EXTREMITY: Primary | ICD-10-CM

## 2022-10-06 DIAGNOSIS — G89.29 CHRONIC LOW BACK PAIN WITH SCIATICA, SCIATICA LATERALITY UNSPECIFIED, UNSPECIFIED BACK PAIN LATERALITY: ICD-10-CM

## 2022-10-06 LAB
ALBUMIN SERPL ELPH-MCNC: 4.2 G/DL (ref 2.9–4.4)
ALBUMIN/GLOB SERPL: 1.5 {RATIO} (ref 0.7–1.7)
ALPHA1 GLOB SERPL ELPH-MCNC: 0.3 G/DL (ref 0–0.4)
ALPHA2 GLOB SERPL ELPH-MCNC: 0.7 G/DL (ref 0.4–1)
B-GLOBULIN SERPL ELPH-MCNC: 1.3 G/DL (ref 0.7–1.3)
GAMMA GLOB SERPL ELPH-MCNC: 0.8 G/DL (ref 0.4–1.8)
GLOBULIN SER-MCNC: 3 G/DL (ref 2.2–3.9)
IGA SERPL-MCNC: 325 MG/DL (ref 61–437)
IGG SERPL-MCNC: 831 MG/DL (ref 603–1613)
IGM SERPL-MCNC: 91 MG/DL (ref 20–172)
INTERPRETATION SERPL IEP-IMP: NORMAL
M PROTEIN SERPL ELPH-MCNC: NORMAL G/DL
PROT SERPL-MCNC: 7.2 G/DL (ref 6–8.5)

## 2022-10-07 ENCOUNTER — CLINICAL DOCUMENTATION (OUTPATIENT)
Dept: NEUROSURGERY | Age: 62
End: 2022-10-07

## 2022-10-10 ENCOUNTER — OFFICE VISIT (OUTPATIENT)
Dept: NEUROSURGERY | Age: 62
End: 2022-10-10
Payer: OTHER GOVERNMENT

## 2022-10-10 VITALS
BODY MASS INDEX: 30.62 KG/M2 | TEMPERATURE: 97.5 F | DIASTOLIC BLOOD PRESSURE: 81 MMHG | OXYGEN SATURATION: 98 % | SYSTOLIC BLOOD PRESSURE: 128 MMHG | WEIGHT: 231 LBS | HEIGHT: 73 IN | HEART RATE: 75 BPM

## 2022-10-10 DIAGNOSIS — M54.50 CHRONIC LOW BACK PAIN, UNSPECIFIED BACK PAIN LATERALITY, UNSPECIFIED WHETHER SCIATICA PRESENT: Primary | ICD-10-CM

## 2022-10-10 DIAGNOSIS — G89.29 CHRONIC LOW BACK PAIN, UNSPECIFIED BACK PAIN LATERALITY, UNSPECIFIED WHETHER SCIATICA PRESENT: Primary | ICD-10-CM

## 2022-10-10 DIAGNOSIS — M54.16 LUMBAR RADICULOPATHY: ICD-10-CM

## 2022-10-10 PROCEDURE — 99203 OFFICE O/P NEW LOW 30 MIN: CPT | Performed by: NURSE PRACTITIONER

## 2022-10-10 ASSESSMENT — PATIENT HEALTH QUESTIONNAIRE - PHQ9
SUM OF ALL RESPONSES TO PHQ QUESTIONS 1-9: 0
2. FEELING DOWN, DEPRESSED OR HOPELESS: 0
1. LITTLE INTEREST OR PLEASURE IN DOING THINGS: 0
SUM OF ALL RESPONSES TO PHQ QUESTIONS 1-9: 0
SUM OF ALL RESPONSES TO PHQ QUESTIONS 1-9: 0
SUM OF ALL RESPONSES TO PHQ9 QUESTIONS 1 & 2: 0
SUM OF ALL RESPONSES TO PHQ QUESTIONS 1-9: 0

## 2022-10-10 NOTE — PROGRESS NOTES
[facility] NOTE:   History of Present Illness:    CC: Right leg pain and numbness    Soha Redd is a 59 y.o. male here to be evaluated for his right leg pain and numbness. Patient had an L5-S1 fusion in 2015 while he was living in [place]. Patient states that he has had some residual deficits after that surgery including generalized right leg weakness. Patient states that several years ago he began to have a return of his symptoms of pain running down the right leg causing numbness in the bottom of his foot. Patient states he has been getting a little bit of numbness in the bottoms of both feet. Patient did undergo a bilateral lower extremity EMG nerve conduction study recently that revealed some permanent right leg nerve damage as well as some polyneuropathy in both lower extremities.     Past Medical History:   Diagnosis Date    Benign prostatic hyperplasia with weak urinary stream     Chronic pain of left knee     Chronic pain of right knee     COPD (chronic obstructive pulmonary disease) ([facility])     Hearing loss     Kidney stone     Kidney stone     2018 passes spontaneously    PAC (premature atrial contraction)     Prostatitis     x2 episodes, previously evaluated by urology    PVC (premature ventricular contraction)     Tinnitus       Past Surgical History:   Procedure Laterality Date    COLONOSCOPY      JOINT REPLACEMENT Right     November 2021    SINUS ENDOSCOPY      SPINE SURGERY      2015 L5 and S1     Allergies   Allergen Reactions    Penicillins Anaphylaxis     Childhood allergy per patient severe reaction          Family History   Problem Relation Age of Onset    Cancer Mother         gyn cancer    No Known Problems Father     No Known Problems Sister     No Known Problems Brother       Social History     Socioeconomic History    Marital status:      Spouse name: Not on file    Number of children: Not on file    Years of education: Not on file    Highest education level: Not on file   Occupational History    Not on file   Tobacco Use    Smoking status: Former     Packs/day: 1.00     Years: 2.00     Pack years: 2.00     Types: Cigarettes     Quit date: 1980     Years since quittin.8    Smokeless tobacco: Never    Tobacco comments:     Quit smoking 40 years ago   Vaping Use    Vaping Use: Never used   Substance and Sexual Activity    Alcohol use: Yes     Comment: 2-3 times a week 2 drinks    Drug use: Never    Sexual activity: Not on file   Other Topics Concern    Not on file   Social History Narrative    Not on file     Social Determinants of Health     Financial Resource Strain: Low Risk     Difficulty of Paying Living Expenses: Not hard at all   Food Insecurity: No Food Insecurity    Worried About Running Out of Food in the Last Year: Never true    [address] in the Last Year: Never true   Transportation Needs: No Transportation Needs    Lack of Transportation (Medical): No    Lack of Transportation (Non-Medical): No   Physical Activity: Not on file   Stress: Not on file   Social Connections: Not on file   Intimate Partner Violence: Not on file   Housing Stability: Not on file     Current Outpatient Medications   Medication Sig Dispense Refill    ibuprofen (ADVIL;MOTRIN) 800 MG tablet Take 1 tablet by mouth every 8 hours as needed for Pain Take sparingly and with food. 90 tablet 0    Fluticasone-Salmeterol (ADVAIR DISKUS IN) Inhale into the lungs As needed      zoster recombinant adjuvanted vaccine (SHINGRIX) 50 MCG/0.5ML SUSR injection Inject 0.5 mLs into the muscle See Admin Instructions 1 dose now for second dose.  0.5 mL 0    albuterol sulfate HFA (PROVENTIL;VENTOLIN;PROAIR) 108 (90 Base) MCG/ACT inhaler Inhale 2 puffs into the lungs every 6 hours as needed for Wheezing or Shortness of Breath 18 g 2    cyclobenzaprine (FLEXERIL) 10 MG tablet Take 1 tablet by mouth 2 times daily as needed for Muscle spasms 14 tablet 0     No current facility-administered medications for this visit. Patient Active Problem List   Diagnosis    Chronic low back pain without sciatica    Left elbow pain    Left foot pain    Neuroma of foot    Obstructive lung disease (generalized) (HCC)    Osteoarthritis of right knee    Prediabetes    Prostatitis, acute    Spondylolisthesis    Right leg numbness    Numbness of right foot    Dyspnea on exertion    Airway obstruction    Abnormal pulmonary function          ROS Review of Systems    Constitutional:                    No recent weight changes, fever, fatigue, sleep difficulties, loss of appetite   ENT/Mouth:  No hearing loss, No ringing in the ears, chronic sinus problem, nose bleeds,sore throat, voice change, hoarseness, swollen glands in neck, or difficulties with chewing and swallowing. Cardiovascular:  No chest pain/angina pectoris, palpitations, swelling of feet/ankles/hands, or calf pain while walking. Respiratory: No chronic or frequent coughs, spitting up blood, shortness of breath, No asthma, or wheezing. Gastrointestinal: No a bdominal pain, heartburn, nausea, vomiting, constipation, or frequent diarrhea     Genitourinary: No frequent urination, burning or painful urination, or blood in urine     Musculoskeletal:   POS right leg pain and numbness     Integument:   No rash/itching     Neurological:   Dizziness/vertigo, No numbness/tingling sensation, tremors, No weakness in limbs, frequent or recurring headaches, memory loss or confusion.        Physical Exam:    General: No acute distress  Head normocephalic and atraumatic  Mood and affect appropriate  CV: Regular rate   Resp: No increased work of breathing  Skin: warm and dry   Awake, alert, and oriented   Speech fluent  Eyes open spontaneously   Face symmetric and tongue midline on protrusion  Sternocleidomastoid and trapezius 5/5  No mid-line cervical, thoracic, or lumbar tenderness to palpation   Patient with strength exam as follows:   Upper Extremities: Right Left      Deltoid  5 5    Biceps  5 5    Triceps  5 5      5 5   Hand Intrinsics  5 5  Wrist flexors/extensors  5 5     Lower Extremities:      Hip Flex +4 5    Quads  +4 5    Hamstrings +4 5    Dorsiflex +4 5    Plantarflex +4 5    EHL  +4 5  Sensation intact to light touch and pin-prick   DTR 2+  No clonus or babinski present   No Martinez's sign present bilaterally   Gait normal    Assessment & Plan:  Jose A Fernandez is a 59 y.o. male who presents to be evaluated for his right leg pain and numbness and tingling. I am sending the patient for AP and lateral lumbar x-rays as well as a lumbar MRI with and without contrast to check for any evidence of new nerve compromise. Patient will follow up with me after the imaging is complete to review the study. No diagnosis found. Notes are transcribed with [employer], a medical voice recording dictation service, and may contain minor errors.     Gerhard Gomez, NP  [id number]

## 2022-10-20 ENCOUNTER — HOSPITAL ENCOUNTER (OUTPATIENT)
Dept: MRI IMAGING | Age: 62
Discharge: HOME | End: 2022-10-23
Payer: OTHER GOVERNMENT

## 2022-10-20 ENCOUNTER — HOSPITAL ENCOUNTER (OUTPATIENT)
Dept: GENERAL RADIOLOGY | Age: 62
Discharge: HOME | End: 2022-10-23
Payer: OTHER GOVERNMENT

## 2022-10-20 DIAGNOSIS — M54.16 LUMBAR RADICULOPATHY: ICD-10-CM

## 2022-10-20 DIAGNOSIS — G89.29 CHRONIC LOW BACK PAIN, UNSPECIFIED BACK PAIN LATERALITY, UNSPECIFIED WHETHER SCIATICA PRESENT: ICD-10-CM

## 2022-10-20 DIAGNOSIS — M54.50 CHRONIC LOW BACK PAIN, UNSPECIFIED BACK PAIN LATERALITY, UNSPECIFIED WHETHER SCIATICA PRESENT: ICD-10-CM

## 2022-10-20 PROCEDURE — 6360000004 HC RX CONTRAST MEDICATION: Performed by: NURSE PRACTITIONER

## 2022-10-20 PROCEDURE — 2580000003 HC RX 258: Performed by: NURSE PRACTITIONER

## 2022-10-20 PROCEDURE — 72158 MRI LUMBAR SPINE W/O & W/DYE: CPT

## 2022-10-20 PROCEDURE — 72100 X-RAY EXAM L-S SPINE 2/3 VWS: CPT

## 2022-10-20 PROCEDURE — A9579 GAD-BASE MR CONTRAST NOS,1ML: HCPCS | Performed by: NURSE PRACTITIONER

## 2022-10-20 RX ORDER — SODIUM CHLORIDE 0.9 % (FLUSH) 0.9 %
10 SYRINGE (ML) INJECTION AS NEEDED
Status: DISCONTINUED | OUTPATIENT
Start: 2022-10-20 | End: 2022-10-24 | Stop reason: HOSPADM

## 2022-10-20 RX ADMIN — GADOTERIDOL 21 ML: 279.3 INJECTION, SOLUTION INTRAVENOUS at 16:26

## 2022-10-20 RX ADMIN — SODIUM CHLORIDE, PRESERVATIVE FREE 10 ML: 5 INJECTION INTRAVENOUS at 16:26

## 2022-11-02 ENCOUNTER — OFFICE VISIT (OUTPATIENT)
Dept: NEUROSURGERY | Age: 62
End: 2022-11-02
Payer: OTHER GOVERNMENT

## 2022-11-02 VITALS
WEIGHT: 231 LBS | TEMPERATURE: 97.3 F | HEIGHT: 73 IN | BODY MASS INDEX: 30.62 KG/M2 | SYSTOLIC BLOOD PRESSURE: 134 MMHG | HEART RATE: 81 BPM | OXYGEN SATURATION: 96 % | DIASTOLIC BLOOD PRESSURE: 75 MMHG

## 2022-11-02 DIAGNOSIS — M54.16 LUMBAR RADICULOPATHY: Primary | ICD-10-CM

## 2022-11-02 PROCEDURE — 99213 OFFICE O/P EST LOW 20 MIN: CPT | Performed by: NURSE PRACTITIONER

## 2022-11-02 ASSESSMENT — PATIENT HEALTH QUESTIONNAIRE - PHQ9
SUM OF ALL RESPONSES TO PHQ QUESTIONS 1-9: 0
SUM OF ALL RESPONSES TO PHQ9 QUESTIONS 1 & 2: 0
SUM OF ALL RESPONSES TO PHQ QUESTIONS 1-9: 0
1. LITTLE INTEREST OR PLEASURE IN DOING THINGS: 0
SUM OF ALL RESPONSES TO PHQ QUESTIONS 1-9: 0
SUM OF ALL RESPONSES TO PHQ QUESTIONS 1-9: 0
2. FEELING DOWN, DEPRESSED OR HOPELESS: 0

## 2022-11-02 NOTE — PROGRESS NOTES
[facility] NOTE:   History of Present Illness:    CC: Lumbar x-ray and MRI review    Priya Fofana is a 59 y.o. male here to review his lumbar x-rays and MRI. Patient states he still has a lot of the same right leg symptoms that he has had for some time. Patient states he does have persistent pain that runs down the outside of his right leg. Patient is also aware that he does have some permanent nerve damage in that right leg. The lumbar x-rays reveal an old solid L5-S1 fusion. The lumbar MRI reveals an L5-S1 fusion of a grade 2 spondylolisthesis, degenerative changes at L4-5 causing right-sided foraminal stenosis.     Past Medical History:   Diagnosis Date    Benign prostatic hyperplasia with weak urinary stream     Chronic pain of left knee     Chronic pain of right knee     COPD (chronic obstructive pulmonary disease) (HCC)     Hearing loss     Kidney stone     Kidney stone     2018 passes spontaneously    PAC (premature atrial contraction)     Prostatitis     x2 episodes, previously evaluated by urology    PVC (premature ventricular contraction)     Tinnitus       Past Surgical History:   Procedure Laterality Date    COLONOSCOPY      JOINT REPLACEMENT Right     November 2021    SINUS ENDOSCOPY      SPINE SURGERY      2015 L5 and S1     Allergies   Allergen Reactions    Penicillins Anaphylaxis     Childhood allergy per patient severe reaction          Family History   Problem Relation Age of Onset    Cancer Mother         gyn cancer    No Known Problems Father     No Known Problems Sister     No Known Problems Brother       Social History     Socioeconomic History    Marital status:      Spouse name: Not on file    Number of children: Not on file    Years of education: Not on file    Highest education level: Not on file   Occupational History    Not on file   Tobacco Use    Smoking status: Former     Packs/day: 1.00     Years: 2.00     Pack years: 2.00     Types: Cigarettes     Quit date: 1980     Years since quittin.8    Smokeless tobacco: Never    Tobacco comments:     Quit smoking 40 years ago   Vaping Use    Vaping Use: Never used   Substance and Sexual Activity    Alcohol use: Yes     Comment: 2-3 times a week 2 drinks    Drug use: Never    Sexual activity: Not on file   Other Topics Concern    Not on file   Social History Narrative    Not on file     Social Determinants of Health     Financial Resource Strain: Low Risk     Difficulty of Paying Living Expenses: Not hard at all   Food Insecurity: No Food Insecurity    Worried About Running Out of Food in the Last Year: Never true    Ran Out of Food in the Last Year: Never true   Transportation Needs: No Transportation Needs    Lack of Transportation (Medical): No    Lack of Transportation (Non-Medical): No   Physical Activity: Not on file   Stress: Not on file   Social Connections: Not on file   Intimate Partner Violence: Not on file   Housing Stability: Not on file     Current Outpatient Medications   Medication Sig Dispense Refill    ibuprofen (ADVIL;MOTRIN) 800 MG tablet Take 1 tablet by mouth every 8 hours as needed for Pain Take sparingly and with food. 90 tablet 0    Fluticasone-Salmeterol (ADVAIR DISKUS IN) Inhale into the lungs As needed      zoster recombinant adjuvanted vaccine (SHINGRIX) 50 MCG/0.5ML SUSR injection Inject 0.5 mLs into the muscle See Admin Instructions 1 dose now for second dose. 0.5 mL 0    albuterol sulfate HFA (PROVENTIL;VENTOLIN;PROAIR) 108 (90 Base) MCG/ACT inhaler Inhale 2 puffs into the lungs every 6 hours as needed for Wheezing or Shortness of Breath 18 g 2    cyclobenzaprine (FLEXERIL) 10 MG tablet Take 1 tablet by mouth 2 times daily as needed for Muscle spasms 14 tablet 0     No current facility-administered medications for this visit.      Patient Active Problem List   Diagnosis    Chronic low back pain without sciatica    Left elbow pain    Left foot pain    Neuroma of foot Obstructive lung disease (generalized) ([facility] [address]    Osteoarthritis of right knee    Prediabetes    Prostatitis, acute    Spondylolisthesis    Right leg numbness    Numbness of right foot    Dyspnea on exertion    Airway obstruction    Abnormal pulmonary function          ROS Review of Systems    Constitutional:                    No recent weight changes, fever, fatigue, sleep difficulties, loss of appetite   ENT/Mouth:  No hearing loss, No ringing in the ears, chronic sinus problem, nose bleeds,sore throat, voice change, hoarseness, swollen glands in neck, or difficulties with chewing and swallowing. Cardiovascular:  No chest pain/angina pectoris, palpitations, swelling of feet/ankles/hands, or calf pain while walking. Respiratory: No chronic or frequent coughs, spitting up blood, shortness of breath, No asthma, or wheezing. Gastrointestinal: No a bdominal pain, heartburn, nausea, vomiting, constipation, or frequent diarrhea     Genitourinary: No frequent urination, burning or painful urination, or blood in urine     Musculoskeletal:   POS right leg pain and numbness     Integument:   No rash/itching     Neurological:   Dizziness/vertigo, No numbness/tingling sensation, tremors, No weakness in limbs, frequent or recurring headaches, memory loss or confusion.        Physical Exam:    General: No acute distress  Head normocephalic and atraumatic  Mood and affect appropriate  CV: Regular rate   Resp: No increased work of breathing  Skin: warm and dry   Awake, alert, and oriented   Speech fluent  Eyes open spontaneously   Face symmetric and tongue midline on protrusion  Sternocleidomastoid and trapezius 5/5  No mid-line cervical, thoracic, or lumbar tenderness to palpation   Patient with strength exam as follows:   Upper Extremities: Right Left      Deltoid  5 5    Biceps  5 5    Triceps  5 5      5 5   Hand Intrinsics  5 5  Wrist flexors/extensors  5 5     Lower Extremities:      Hip Flex +4 5    Quads  +4 5    Hamstrings +4 5    Dorsiflex +4 5    Plantarflex +4 5    EHL  +4 5  Sensation intact to light touch and pin-prick   DTR 2+  No clonus or babinski present   No Martinez's sign present bilaterally   Gait slight right leg limp    Assessment & Plan:  Colby Evans is a 59 y.o. male who presents to review his lumbar MRI and x-rays. I have intimately reviewed interpret the patient's imaging and discussed possible surgical options with the patient but at this time the patient would like to continue with conservative management. Patient is encouraged to focus on core strength exercising and may be a good candidate for right sided L4-5 epidural steroid injections. No diagnosis found. Notes are transcribed with [employer], a medical voice recording dictation service, and may contain minor errors.     Carline Monson NP  [id number]

## 2022-11-14 DIAGNOSIS — J44.9 OBSTRUCTIVE LUNG DISEASE (GENERALIZED) (HCC): Primary | ICD-10-CM

## 2022-11-14 DIAGNOSIS — M43.10 SPONDYLOLISTHESIS, UNSPECIFIED SPINAL REGION: ICD-10-CM

## 2022-11-14 RX ORDER — FLUTICASONE PROPIONATE AND SALMETEROL 250; 50 UG/1; UG/1
1 POWDER RESPIRATORY (INHALATION) EVERY 12 HOURS
Qty: 60 EACH | Refills: 5 | Status: SHIPPED | OUTPATIENT
Start: 2022-11-14

## 2022-11-14 RX ORDER — FLUTICASONE PROPIONATE AND SALMETEROL 250; 50 UG/1; UG/1
1 POWDER RESPIRATORY (INHALATION) EVERY 12 HOURS
COMMUNITY
Start: 2021-02-08 | End: 2022-11-14 | Stop reason: SDUPTHER

## 2022-11-14 RX ORDER — FLUTICASONE PROPIONATE AND SALMETEROL 250; 50 UG/1; UG/1
POWDER RESPIRATORY (INHALATION)
OUTPATIENT
Start: 2022-11-14

## 2023-01-30 ENCOUNTER — OFFICE VISIT (OUTPATIENT)
Dept: FAMILY MEDICINE CLINIC | Facility: CLINIC | Age: 63
End: 2023-01-30
Payer: OTHER GOVERNMENT

## 2023-01-30 VITALS
HEIGHT: 73 IN | HEART RATE: 78 BPM | OXYGEN SATURATION: 99 % | DIASTOLIC BLOOD PRESSURE: 62 MMHG | WEIGHT: 223.3 LBS | SYSTOLIC BLOOD PRESSURE: 110 MMHG | BODY MASS INDEX: 29.6 KG/M2

## 2023-01-30 DIAGNOSIS — M43.10 SPONDYLOLISTHESIS, UNSPECIFIED SPINAL REGION: ICD-10-CM

## 2023-01-30 DIAGNOSIS — Z13.220 ENCOUNTER FOR SCREENING FOR LIPID DISORDER: ICD-10-CM

## 2023-01-30 DIAGNOSIS — R73.03 PREDIABETES: Primary | ICD-10-CM

## 2023-01-30 DIAGNOSIS — D53.9 MACROCYTIC ANEMIA: ICD-10-CM

## 2023-01-30 DIAGNOSIS — J44.9 CHRONIC OBSTRUCTIVE PULMONARY DISEASE, UNSPECIFIED COPD TYPE (HCC): ICD-10-CM

## 2023-01-30 LAB
BASOPHILS # BLD: 0.1 K/UL (ref 0–0.2)
BASOPHILS NFR BLD: 1 % (ref 0–2)
CHOLEST SERPL-MCNC: 204 MG/DL
DIFFERENTIAL METHOD BLD: NORMAL
EOSINOPHIL # BLD: 0.2 K/UL (ref 0–0.8)
EOSINOPHIL NFR BLD: 3 % (ref 0.5–7.8)
ERYTHROCYTE [DISTWIDTH] IN BLOOD BY AUTOMATED COUNT: 12.9 % (ref 11.9–14.6)
HBA1C MFR BLD: 5.8 %
HCT VFR BLD AUTO: 44.9 % (ref 41.1–50.3)
HDLC SERPL-MCNC: 52 MG/DL (ref 40–60)
HDLC SERPL: 3.9
HGB BLD-MCNC: 14.3 G/DL (ref 13.6–17.2)
IMM GRANULOCYTES # BLD AUTO: 0 K/UL (ref 0–0.5)
IMM GRANULOCYTES NFR BLD AUTO: 0 % (ref 0–5)
LDLC SERPL CALC-MCNC: 128.2 MG/DL
LYMPHOCYTES # BLD: 1.4 K/UL (ref 0.5–4.6)
LYMPHOCYTES NFR BLD: 26 % (ref 13–44)
MCH RBC QN AUTO: 31.5 PG (ref 26.1–32.9)
MCHC RBC AUTO-ENTMCNC: 31.8 G/DL (ref 31.4–35)
MCV RBC AUTO: 98.9 FL (ref 82–102)
MONOCYTES # BLD: 0.4 K/UL (ref 0.1–1.3)
MONOCYTES NFR BLD: 7 % (ref 4–12)
NEUTS SEG # BLD: 3.3 K/UL (ref 1.7–8.2)
NEUTS SEG NFR BLD: 63 % (ref 43–78)
NRBC # BLD: 0 K/UL (ref 0–0.2)
PLATELET # BLD AUTO: 211 K/UL (ref 150–450)
PMV BLD AUTO: 10.9 FL (ref 9.4–12.3)
RBC # BLD AUTO: 4.54 M/UL (ref 4.23–5.6)
TRIGL SERPL-MCNC: 119 MG/DL (ref 35–150)
VLDLC SERPL CALC-MCNC: 23.8 MG/DL (ref 6–23)
WBC # BLD AUTO: 5.4 K/UL (ref 4.3–11.1)

## 2023-01-30 PROCEDURE — 99213 OFFICE O/P EST LOW 20 MIN: CPT | Performed by: NURSE PRACTITIONER

## 2023-01-30 PROCEDURE — 83036 HEMOGLOBIN GLYCOSYLATED A1C: CPT | Performed by: NURSE PRACTITIONER

## 2023-01-30 RX ORDER — IBUPROFEN 800 MG/1
800 TABLET ORAL EVERY 8 HOURS PRN
Qty: 42 TABLET | Refills: 2 | Status: SHIPPED | OUTPATIENT
Start: 2023-01-30

## 2023-01-30 RX ORDER — TAMSULOSIN HYDROCHLORIDE 0.4 MG/1
CAPSULE ORAL DAILY
COMMUNITY
Start: 2018-07-06

## 2023-01-30 ASSESSMENT — PATIENT HEALTH QUESTIONNAIRE - PHQ9
SUM OF ALL RESPONSES TO PHQ QUESTIONS 1-9: 0
SUM OF ALL RESPONSES TO PHQ9 QUESTIONS 1 & 2: 0
2. FEELING DOWN, DEPRESSED OR HOPELESS: 0
SUM OF ALL RESPONSES TO PHQ QUESTIONS 1-9: 0
SUM OF ALL RESPONSES TO PHQ QUESTIONS 1-9: 0
1. LITTLE INTEREST OR PLEASURE IN DOING THINGS: 0
SUM OF ALL RESPONSES TO PHQ QUESTIONS 1-9: 0

## 2023-01-30 ASSESSMENT — ENCOUNTER SYMPTOMS
RESPIRATORY NEGATIVE: 1
BACK PAIN: 1
GASTROINTESTINAL NEGATIVE: 1

## 2023-01-30 NOTE — PROGRESS NOTES
Parvez Toledo is a 57 y.o. male who presents today for the following:  Chief Complaint   Patient presents with    Follow-up     Routine 6 month follow up         Allergies   Allergen Reactions    Penicillins Anaphylaxis     Childhood allergy per patient severe reaction           Current Outpatient Medications   Medication Sig Dispense Refill    ibuprofen (ADVIL;MOTRIN) 800 MG tablet Take 1 tablet by mouth every 8 hours as needed for Pain Take sparingly and with food. 42 tablet 2    fluticasone-salmeterol (ADVAIR DISKUS) 250-50 MCG/ACT AEPB diskus inhaler Inhale 1 puff into the lungs in the morning and 1 puff in the evening. 60 each 5    albuterol sulfate HFA (PROVENTIL;VENTOLIN;PROAIR) 108 (90 Base) MCG/ACT inhaler Inhale 2 puffs into the lungs every 6 hours as needed for Wheezing or Shortness of Breath 18 g 2    tamsulosin (FLOMAX) 0.4 MG capsule Take by mouth daily (Patient not taking: Reported on 1/30/2023)      cyclobenzaprine (FLEXERIL) 10 MG tablet Take 1 tablet by mouth 2 times daily as needed for Muscle spasms (Patient not taking: Reported on 1/30/2023) 14 tablet 0     No current facility-administered medications for this visit.        Past Medical History:   Diagnosis Date    Benign prostatic hyperplasia with weak urinary stream     Chronic back pain L4/L5    Chronic pain of left knee     Chronic pain of right knee     COPD (chronic obstructive pulmonary disease) (HCC)     Hearing loss     Kidney stone     Kidney stone     2018 passes spontaneously    Neuropathy Right Foot    PAC (premature atrial contraction)     Prostatitis     x2 episodes, previously evaluated by urology    PVC (premature ventricular contraction)     Tinnitus        Past Surgical History:   Procedure Laterality Date    COLONOSCOPY      JOINT REPLACEMENT Right     November 2021    SINUS ENDOSCOPY      SPINE SURGERY      2015 L5 and S1       Social History     Tobacco Use    Smoking status: Former     Packs/day: 1.00     Years: 2.00 Pack years: 2.00     Types: Cigarettes     Start date: 1978     Quit date: 1980     Years since quittin.5    Smokeless tobacco: Never    Tobacco comments:     Quit smoking 40 years ago   Substance Use Topics    Alcohol use: Yes     Alcohol/week: 3.0 standard drinks     Types: 3 Glasses of wine per week     Comment: 2-3 times a week 2 drinks        Family History   Problem Relation Age of Onset    Cancer Mother         gyn cancer    Other Mother         Dimentia    No Known Problems Father     No Known Problems Sister     No Known Problems Brother        HPI   Pt presents for f/u conditions listed. Hx of macrocytic anemia, referred to urology for prostatitis 2 episodes, COPD, L5-S1 fusion in 2015, prediabetes, and bilateral lower extremity EMG nerve conduction study recently that revealed some permanent right leg nerve damage as well as some polyneuropathy in both lower extremities. Goes through the  t[employer].[address] St for lungs. Once to twice a month rescue inhaler. Humidity causes flare ups. Chronic rhinitis and cough. Review of Systems   Constitutional: Negative. Goal 220 weight. Respiratory: Negative. Cardiovascular:  Positive for palpitations. Negative for chest pain and leg swelling. Caffeine exacerbates. Otherwise, stable. Gastrointestinal: Negative. Genitourinary: Negative. Musculoskeletal:  Positive for arthralgias and back pain. Neurological:  Positive for numbness. Right lateral leg and bottom of right foot s/p knee surgery. /62   Pulse 78   Ht 6' 1\" (1.854 m)   Wt 223 lb 4.8 oz (101.3 kg)   SpO2 99%   BMI 29.46 kg/m²     Physical Exam  Constitutional:       General: He is not in acute distress. Appearance: Normal appearance. He is not ill-appearing. HENT:      Head: Normocephalic and atraumatic. Right Ear: External ear normal.      Left Ear: External ear normal.   Eyes:      Extraocular Movements: Extraocular movements intact. Conjunctiva/sclera: Conjunctivae normal.      Pupils: Pupils are equal, round, and reactive to light. Cardiovascular:      Rate and Rhythm: Normal rate and regular rhythm. Pulses: Normal pulses. Heart sounds: Normal heart sounds. Pulmonary:      Effort: Pulmonary effort is normal.      Breath sounds: Normal breath sounds. Abdominal:      General: Bowel sounds are normal. There is no distension. Palpations: Abdomen is soft. Tenderness: There is no abdominal tenderness. Musculoskeletal:         General: Normal range of motion. Cervical back: Normal range of motion and neck supple. Right lower leg: No edema. Left lower leg: No edema. Skin:     General: Skin is warm and dry. Coloration: Skin is not jaundiced or pale. Neurological:      General: No focal deficit present. Mental Status: He is alert and oriented to person, place, and time. Psychiatric:         Mood and Affect: Mood normal.         Behavior: Behavior normal.         Thought Content: Thought content normal.         Judgment: Judgment normal.        1. Prediabetes  -     AMB POC HEMOGLOBIN A1C  2. Chronic obstructive pulmonary disease, unspecified COPD type ([facility] [address]  3. Macrocytic anemia  -     CBC with Auto Differential; Future  4. Encounter for screening for lipid disorder  -     Lipid Panel; Future  5. Spondylolisthesis, unspecified spinal region  -     ibuprofen (ADVIL;MOTRIN) 800 MG tablet; Take 1 tablet by mouth every 8 hours as needed for Pain Take sparingly and with food. , Disp-42 tablet, R-2Normal     Prediabetes stable a1c 5.8. COPD stable rare use of rescue inhaler. Check fasting lipid panel. Takes advil rarely for right knee and lower back pain. Discussed important to stay hydated 1-2 liters of water daily and monitor for GIB. Takes tylenol 650 mg and if needs break through med, he will take the ibuprofen. Patient informed, we will call with blood work results within one week.   If you have not heard regarding results in over a week, please contact office. You can also review results on [employer].        Follow-up and Dispositions    Return in about 6 months (around 7/30/2023) for Annual.         EUGENIE Seo - CNP

## 2023-02-07 PROBLEM — E78.2 MIXED HYPERLIPIDEMIA: Status: ACTIVE | Noted: 2023-02-07

## 2023-07-23 DIAGNOSIS — M43.10 SPONDYLOLISTHESIS, UNSPECIFIED SPINAL REGION: ICD-10-CM

## 2023-07-23 ASSESSMENT — PATIENT HEALTH QUESTIONNAIRE - PHQ9
SUM OF ALL RESPONSES TO PHQ QUESTIONS 1-9: 0
SUM OF ALL RESPONSES TO PHQ9 QUESTIONS 1 & 2: 0
SUM OF ALL RESPONSES TO PHQ QUESTIONS 1-9: 0
2. FEELING DOWN, DEPRESSED OR HOPELESS: 0
1. LITTLE INTEREST OR PLEASURE IN DOING THINGS: 0
SUM OF ALL RESPONSES TO PHQ QUESTIONS 1-9: 0
SUM OF ALL RESPONSES TO PHQ QUESTIONS 1-9: 0
1. LITTLE INTEREST OR PLEASURE IN DOING THINGS: NOT AT ALL
2. FEELING DOWN, DEPRESSED OR HOPELESS: NOT AT ALL
SUM OF ALL RESPONSES TO PHQ9 QUESTIONS 1 & 2: 0

## 2023-07-24 RX ORDER — IBUPROFEN 800 MG/1
800 TABLET ORAL EVERY 8 HOURS PRN
Qty: 21 TABLET | Refills: 0 | Status: SHIPPED | OUTPATIENT
Start: 2023-07-24

## 2023-07-26 ENCOUNTER — OFFICE VISIT (OUTPATIENT)
Dept: FAMILY MEDICINE CLINIC | Facility: CLINIC | Age: 63
End: 2023-07-26
Payer: OTHER GOVERNMENT

## 2023-07-26 VITALS
BODY MASS INDEX: 29.85 KG/M2 | HEART RATE: 68 BPM | TEMPERATURE: 98.1 F | OXYGEN SATURATION: 97 % | HEIGHT: 74 IN | SYSTOLIC BLOOD PRESSURE: 110 MMHG | DIASTOLIC BLOOD PRESSURE: 70 MMHG | WEIGHT: 232.6 LBS

## 2023-07-26 DIAGNOSIS — R73.03 PREDIABETES: Primary | ICD-10-CM

## 2023-07-26 DIAGNOSIS — E78.2 MIXED HYPERLIPIDEMIA: ICD-10-CM

## 2023-07-26 DIAGNOSIS — Z12.11 COLON CANCER SCREENING: ICD-10-CM

## 2023-07-26 DIAGNOSIS — Z79.899 MEDICATION MANAGEMENT: ICD-10-CM

## 2023-07-26 DIAGNOSIS — Z23 NEED FOR SHINGLES VACCINE: ICD-10-CM

## 2023-07-26 DIAGNOSIS — E66.3 OVERWEIGHT (BMI 25.0-29.9): ICD-10-CM

## 2023-07-26 DIAGNOSIS — Z87.438 HISTORY OF PROSTATITIS: ICD-10-CM

## 2023-07-26 DIAGNOSIS — Z12.5 PROSTATE CANCER SCREENING: ICD-10-CM

## 2023-07-26 LAB
ALBUMIN SERPL-MCNC: 4.2 G/DL (ref 3.2–4.6)
ALBUMIN/GLOB SERPL: 1.4 (ref 0.4–1.6)
ALP SERPL-CCNC: 57 U/L (ref 50–136)
ALT SERPL-CCNC: 35 U/L (ref 12–65)
ANION GAP SERPL CALC-SCNC: 6 MMOL/L (ref 2–11)
AST SERPL-CCNC: 17 U/L (ref 15–37)
BASOPHILS # BLD: 0.1 K/UL (ref 0–0.2)
BASOPHILS NFR BLD: 1 % (ref 0–2)
BILIRUB SERPL-MCNC: 0.4 MG/DL (ref 0.2–1.1)
BUN SERPL-MCNC: 19 MG/DL (ref 8–23)
CALCIUM SERPL-MCNC: 9.4 MG/DL (ref 8.3–10.4)
CHLORIDE SERPL-SCNC: 109 MMOL/L (ref 101–110)
CHOLEST SERPL-MCNC: 194 MG/DL
CO2 SERPL-SCNC: 26 MMOL/L (ref 21–32)
CREAT SERPL-MCNC: 1 MG/DL (ref 0.8–1.5)
DIFFERENTIAL METHOD BLD: NORMAL
EOSINOPHIL # BLD: 0.2 K/UL (ref 0–0.8)
EOSINOPHIL NFR BLD: 4 % (ref 0.5–7.8)
ERYTHROCYTE [DISTWIDTH] IN BLOOD BY AUTOMATED COUNT: 12.8 % (ref 11.9–14.6)
GLOBULIN SER CALC-MCNC: 3 G/DL (ref 2.8–4.5)
GLUCOSE SERPL-MCNC: 113 MG/DL (ref 65–100)
HCT VFR BLD AUTO: 44.4 % (ref 41.1–50.3)
HDLC SERPL-MCNC: 50 MG/DL (ref 40–60)
HDLC SERPL: 3.9
HGB BLD-MCNC: 14.4 G/DL (ref 13.6–17.2)
IMM GRANULOCYTES # BLD AUTO: 0 K/UL (ref 0–0.5)
IMM GRANULOCYTES NFR BLD AUTO: 0 % (ref 0–5)
LDLC SERPL CALC-MCNC: 101.4 MG/DL
LYMPHOCYTES # BLD: 1.5 K/UL (ref 0.5–4.6)
LYMPHOCYTES NFR BLD: 28 % (ref 13–44)
MCH RBC QN AUTO: 32.3 PG (ref 26.1–32.9)
MCHC RBC AUTO-ENTMCNC: 32.4 G/DL (ref 31.4–35)
MCV RBC AUTO: 99.6 FL (ref 82–102)
MONOCYTES # BLD: 0.4 K/UL (ref 0.1–1.3)
MONOCYTES NFR BLD: 8 % (ref 4–12)
NEUTS SEG # BLD: 3.2 K/UL (ref 1.7–8.2)
NEUTS SEG NFR BLD: 59 % (ref 43–78)
NRBC # BLD: 0 K/UL (ref 0–0.2)
PLATELET # BLD AUTO: 206 K/UL (ref 150–450)
PMV BLD AUTO: 11.1 FL (ref 9.4–12.3)
POTASSIUM SERPL-SCNC: 4.5 MMOL/L (ref 3.5–5.1)
PROT SERPL-MCNC: 7.2 G/DL (ref 6.3–8.2)
PSA SERPL-MCNC: 0.9 NG/ML
RBC # BLD AUTO: 4.46 M/UL (ref 4.23–5.6)
SODIUM SERPL-SCNC: 141 MMOL/L (ref 133–143)
TRIGL SERPL-MCNC: 213 MG/DL (ref 35–150)
VLDLC SERPL CALC-MCNC: 42.6 MG/DL (ref 6–23)
WBC # BLD AUTO: 5.4 K/UL (ref 4.3–11.1)

## 2023-07-26 PROCEDURE — 99396 PREV VISIT EST AGE 40-64: CPT | Performed by: NURSE PRACTITIONER

## 2023-07-26 RX ORDER — ZOSTER VACCINE RECOMBINANT, ADJUVANTED 50 MCG/0.5
0.5 KIT INTRAMUSCULAR SEE ADMIN INSTRUCTIONS
Qty: 0.5 ML | Refills: 0 | Status: SHIPPED | OUTPATIENT
Start: 2023-07-26 | End: 2024-01-22

## 2023-07-26 SDOH — ECONOMIC STABILITY: FOOD INSECURITY: WITHIN THE PAST 12 MONTHS, THE FOOD YOU BOUGHT JUST DIDN'T LAST AND YOU DIDN'T HAVE MONEY TO GET MORE.: NEVER TRUE

## 2023-07-26 SDOH — ECONOMIC STABILITY: HOUSING INSECURITY
IN THE LAST 12 MONTHS, WAS THERE A TIME WHEN YOU DID NOT HAVE A STEADY PLACE TO SLEEP OR SLEPT IN A SHELTER (INCLUDING NOW)?: NO

## 2023-07-26 SDOH — ECONOMIC STABILITY: FOOD INSECURITY: WITHIN THE PAST 12 MONTHS, YOU WORRIED THAT YOUR FOOD WOULD RUN OUT BEFORE YOU GOT MONEY TO BUY MORE.: NEVER TRUE

## 2023-07-26 SDOH — ECONOMIC STABILITY: INCOME INSECURITY: HOW HARD IS IT FOR YOU TO PAY FOR THE VERY BASICS LIKE FOOD, HOUSING, MEDICAL CARE, AND HEATING?: NOT HARD AT ALL

## 2023-07-26 NOTE — PROGRESS NOTES
Eliseo Lara is a 57 y.o. male who presents today for the following:  Chief Complaint   Patient presents with    Follow-up     Pt presents for a F/U. Pt is wondering about referral to neuro. Has constant numbness on rt foot. Allergies   Allergen Reactions    Penicillins Anaphylaxis     Childhood allergy per patient severe reaction           Current Outpatient Medications   Medication Sig Dispense Refill    zoster recombinant adjuvanted vaccine (SHINGRIX) 50 MCG/0.5ML SUSR injection Inject 0.5 mLs into the muscle See Admin Instructions 1 dose now and repeat in 2-6 months 0.5 mL 0    fluticasone-salmeterol (ADVAIR DISKUS) 250-50 MCG/ACT AEPB diskus inhaler Inhale 1 puff into the lungs in the morning and 1 puff in the evening. 60 each 5    albuterol sulfate HFA (PROVENTIL;VENTOLIN;PROAIR) 108 (90 Base) MCG/ACT inhaler Inhale 2 puffs into the lungs every 6 hours as needed for Wheezing or Shortness of Breath 18 g 2    ibuprofen (ADVIL;MOTRIN) 800 MG tablet Take 1 tablet by mouth every 8 hours as needed for Pain Take sparingly and with food. (Patient not taking: Reported on 7/26/2023) 21 tablet 0    tamsulosin (FLOMAX) 0.4 MG capsule Take by mouth daily (Patient not taking: Reported on 1/30/2023)      cyclobenzaprine (FLEXERIL) 10 MG tablet Take 1 tablet by mouth 2 times daily as needed for Muscle spasms (Patient not taking: Reported on 1/30/2023) 14 tablet 0     No current facility-administered medications for this visit.        Past Medical History:   Diagnosis Date    Benign prostatic hyperplasia with weak urinary stream     Chronic back pain L4/L5    Chronic pain of left knee     Chronic pain of right knee     COPD (chronic obstructive pulmonary disease) ([facility])     Hearing loss     Kidney stone     Kidney stone     2018 passes spontaneously    Neuropathy Right Foot    PAC (premature atrial contraction)     Prostatitis     x2 episodes, previously evaluated by urology    PVC (premature ventricular contraction)

## 2023-07-26 NOTE — PATIENT INSTRUCTIONS
Goal 10,000 steps daily. Intermittent fasting. Patient Education        Learning About the Mediterranean Diet  What is the [address]? The Mediterranean diet is a style of eating rather than a diet plan. It features foods eaten in [facility], [place], [place], and other countries along the [facility]. It emphasizes eating foods like fish, fruits, vegetables, beans, high-fiber breads and whole grains, nuts, and olive oil. This style of eating includes limited red meat, cheese, and sweets. Why choose the Mediterranean diet? A Mediterranean-style diet may improve heart health. It contains more fat than other heart-healthy diets. But the fats are mainly from nuts, unsaturated oils (such as fish oils and olive oil), and certain nut or seed oils (such as canola, soybean, or flaxseed oil). These fats may help protect the heart and blood vessels. How can you get started on the Mediterranean diet? Here are some things you can do to switch to a more Mediterranean way of eating. What to eat  Eat a variety of fruits and vegetables each day, such as grapes, blueberries, tomatoes, broccoli, peppers, figs, olives, spinach, eggplant, beans, lentils, and chickpeas. Eat a variety of whole-grain foods each day, such as oats, brown rice, and whole wheat bread, pasta, and couscous. Eat fish at least 2 times a week. Try tuna, salmon, mackerel, lake trout, herring, or sardines. Eat moderate amounts of low-fat dairy products, such as milk, cheese, or yogurt. Eat moderate amounts of poultry and eggs. Choose healthy (unsaturated) fats, such as nuts, olive oil, and certain nut or seed oils like canola, soybean, and flaxseed. Limit unhealthy (saturated) fats, such as butter, palm oil, and coconut oil. And limit fats found in animal products, such as meat and dairy products made with whole milk. Try to eat red meat only a few times a month in very small amounts.   Limit sweets and desserts to only a

## 2023-07-27 LAB
EST. AVERAGE GLUCOSE BLD GHB EST-MCNC: 131 MG/DL
HBA1C MFR BLD: 6.2 % (ref 4.8–5.6)

## 2023-07-27 NOTE — RESULT ENCOUNTER NOTE
Overall labs stable. His cholesterol has improved. A1c remains the same 6.2 prediabetic. Encourage to continue lifestyle changes discussed in our appt.

## 2023-07-28 PROBLEM — E66.3 OVERWEIGHT (BMI 25.0-29.9): Status: ACTIVE | Noted: 2023-07-28

## 2023-07-28 ASSESSMENT — ENCOUNTER SYMPTOMS
EYES NEGATIVE: 1
RESPIRATORY NEGATIVE: 1
GASTROINTESTINAL NEGATIVE: 1
ALLERGIC/IMMUNOLOGIC NEGATIVE: 1

## 2023-08-17 LAB — NONINV COLON CA DNA+OCC BLD SCRN STL QL: NEGATIVE

## 2023-10-18 ENCOUNTER — OFFICE VISIT (OUTPATIENT)
Dept: FAMILY MEDICINE CLINIC | Facility: CLINIC | Age: 63
End: 2023-10-18
Payer: OTHER GOVERNMENT

## 2023-10-18 VITALS
TEMPERATURE: 98.3 F | SYSTOLIC BLOOD PRESSURE: 138 MMHG | OXYGEN SATURATION: 97 % | DIASTOLIC BLOOD PRESSURE: 86 MMHG | HEART RATE: 76 BPM | WEIGHT: 232.6 LBS | BODY MASS INDEX: 29.85 KG/M2 | HEIGHT: 74 IN

## 2023-10-18 DIAGNOSIS — L23.7 POISON OAK DERMATITIS: Primary | ICD-10-CM

## 2023-10-18 DIAGNOSIS — M43.10 SPONDYLOLISTHESIS, UNSPECIFIED SPINAL REGION: ICD-10-CM

## 2023-10-18 DIAGNOSIS — L29.9 ITCHING: ICD-10-CM

## 2023-10-18 PROCEDURE — 99213 OFFICE O/P EST LOW 20 MIN: CPT | Performed by: NURSE PRACTITIONER

## 2023-10-18 RX ORDER — IBUPROFEN 800 MG/1
800 TABLET ORAL EVERY 8 HOURS PRN
Qty: 21 TABLET | Refills: 0 | Status: SHIPPED | OUTPATIENT
Start: 2023-10-18

## 2023-10-18 RX ORDER — HYDROXYZINE HYDROCHLORIDE 25 MG/1
25 TABLET, FILM COATED ORAL EVERY 8 HOURS PRN
Qty: 30 TABLET | Refills: 0 | Status: SHIPPED | OUTPATIENT
Start: 2023-10-18 | End: 2023-10-28

## 2023-10-18 RX ORDER — PREDNISONE 10 MG/1
TABLET ORAL
Qty: 48 EACH | Refills: 0 | Status: SHIPPED | OUTPATIENT
Start: 2023-10-18

## 2023-10-18 NOTE — PROGRESS NOTES
Jeremi Mccray is a 57 y.o. male who presents today for the following:  Chief Complaint   Patient presents with    Skin Problem     Same day appointment; also having some skin issues. Allergies   Allergen Reactions    Penicillins Anaphylaxis     Childhood allergy per patient severe reaction           Current Outpatient Medications   Medication Sig Dispense Refill    ibuprofen (ADVIL;MOTRIN) 800 MG tablet Take 1 tablet by mouth every 8 hours as needed for Pain Take sparingly and with food. 21 tablet 0    predniSONE 10 MG (48) TBPK Take as directed with food. 48 each 0    hydrOXYzine HCl (ATARAX) 25 MG tablet Take 1 tablet by mouth every 8 hours as needed for Itching 30 tablet 0    zoster recombinant adjuvanted vaccine (SHINGRIX) 50 MCG/0.5ML SUSR injection Inject 0.5 mLs into the muscle See Admin Instructions 1 dose now and repeat in 2-6 months 0.5 mL 0    tamsulosin (FLOMAX) 0.4 MG capsule Take by mouth daily      fluticasone-salmeterol (ADVAIR DISKUS) 250-50 MCG/ACT AEPB diskus inhaler Inhale 1 puff into the lungs in the morning and 1 puff in the evening. 60 each 5    albuterol sulfate HFA (PROVENTIL;VENTOLIN;PROAIR) 108 (90 Base) MCG/ACT inhaler Inhale 2 puffs into the lungs every 6 hours as needed for Wheezing or Shortness of Breath 18 g 2    cyclobenzaprine (FLEXERIL) 10 MG tablet Take 1 tablet by mouth 2 times daily as needed for Muscle spasms 14 tablet 0     No current facility-administered medications for this visit.        Past Medical History:   Diagnosis Date    Benign prostatic hyperplasia with weak urinary stream     Chronic back pain L4/L5    Chronic pain of left knee     Chronic pain of right knee     COPD (chronic obstructive pulmonary disease) (HCC)     Hearing loss     Kidney stone     Kidney stone     2018 passes spontaneously    Neuropathy Right Foot    PAC (premature atrial contraction)     Prostatitis     x2 episodes, previously evaluated by urology    PVC (premature ventricular

## 2023-12-06 ENCOUNTER — OFFICE VISIT (OUTPATIENT)
Dept: FAMILY MEDICINE CLINIC | Facility: CLINIC | Age: 63
End: 2023-12-06
Payer: OTHER GOVERNMENT

## 2023-12-06 VITALS
WEIGHT: 232 LBS | BODY MASS INDEX: 29.79 KG/M2 | HEART RATE: 62 BPM | OXYGEN SATURATION: 98 % | TEMPERATURE: 98.2 F | SYSTOLIC BLOOD PRESSURE: 120 MMHG | DIASTOLIC BLOOD PRESSURE: 64 MMHG

## 2023-12-06 DIAGNOSIS — M43.10 SPONDYLOLISTHESIS, UNSPECIFIED SPINAL REGION: ICD-10-CM

## 2023-12-06 DIAGNOSIS — J44.9 OBSTRUCTIVE LUNG DISEASE (GENERALIZED) (HCC): ICD-10-CM

## 2023-12-06 DIAGNOSIS — G89.29 CHRONIC LOW BACK PAIN WITHOUT SCIATICA, UNSPECIFIED BACK PAIN LATERALITY: Primary | ICD-10-CM

## 2023-12-06 DIAGNOSIS — M54.50 CHRONIC LOW BACK PAIN WITHOUT SCIATICA, UNSPECIFIED BACK PAIN LATERALITY: Primary | ICD-10-CM

## 2023-12-06 PROCEDURE — 99213 OFFICE O/P EST LOW 20 MIN: CPT | Performed by: NURSE PRACTITIONER

## 2023-12-06 PROCEDURE — 1123F ACP DISCUSS/DSCN MKR DOCD: CPT | Performed by: NURSE PRACTITIONER

## 2023-12-06 RX ORDER — IBUPROFEN 800 MG/1
800 TABLET ORAL EVERY 8 HOURS PRN
Qty: 21 TABLET | Refills: 0 | Status: SHIPPED | OUTPATIENT
Start: 2023-12-06

## 2023-12-06 ASSESSMENT — ENCOUNTER SYMPTOMS
BACK PAIN: 1
RESPIRATORY NEGATIVE: 1
RHINORRHEA: 1

## 2023-12-06 NOTE — PROGRESS NOTES
Gino Real is a 70 y.o. 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 21 tablet 0    Handicap Placard MISC by Does not apply route 1 each 0    predniSONE 10 MG (48) TBPK Take as directed with food. 48 each 0    tamsulosin (FLOMAX) 0.4 MG capsule Take by mouth daily      fluticasone-salmeterol (ADVAIR DISKUS) 250-50 MCG/ACT AEPB diskus inhaler Inhale 1 puff into the lungs in the morning and 1 puff in the evening. 60 each 5    albuterol sulfate HFA (PROVENTIL;VENTOLIN;PROAIR) 108 (90 Base) MCG/ACT inhaler Inhale 2 puffs into the lungs every 6 hours as needed for Wheezing or Shortness of Breath 18 g 2    cyclobenzaprine (FLEXERIL) 10 MG tablet Take 1 tablet by mouth 2 times daily as needed for Muscle spasms 14 tablet 0    zoster recombinant adjuvanted vaccine (SHINGRIX) 50 MCG/0.5ML SUSR injection Inject 0.5 mLs into the muscle See Admin Instructions 1 dose now and repeat in 2-6 months 0.5 mL 0     No current facility-administered medications for this visit.        Past Medical History:   Diagnosis Date    Benign prostatic hyperplasia with weak urinary stream     Chronic back pain L4/L5    Chronic pain of left knee     Chronic pain of right knee     COPD (chronic obstructive pulmonary disease) (HCC)     Hearing loss     Kidney stone     Kidney stone     2018 passes spontaneously    Neuropathy Right Foot    PAC (premature atrial contraction)     Prostatitis     x2 episodes, previously evaluated by urology    PVC (premature ventricular contraction)     Tinnitus        Past Surgical

## 2024-07-29 ENCOUNTER — OFFICE VISIT (OUTPATIENT)
Dept: FAMILY MEDICINE CLINIC | Facility: CLINIC | Age: 64
End: 2024-07-29
Payer: MEDICARE

## 2024-07-29 VITALS
WEIGHT: 229 LBS | SYSTOLIC BLOOD PRESSURE: 118 MMHG | OXYGEN SATURATION: 98 % | DIASTOLIC BLOOD PRESSURE: 74 MMHG | BODY MASS INDEX: 29.4 KG/M2 | TEMPERATURE: 98.5 F | HEART RATE: 68 BPM

## 2024-07-29 DIAGNOSIS — N40.0 BENIGN PROSTATIC HYPERPLASIA WITHOUT LOWER URINARY TRACT SYMPTOMS: ICD-10-CM

## 2024-07-29 DIAGNOSIS — E66.3 OVERWEIGHT (BMI 25.0-29.9): ICD-10-CM

## 2024-07-29 DIAGNOSIS — Z13.29 THYROID DISORDER SCREENING: ICD-10-CM

## 2024-07-29 DIAGNOSIS — E78.2 MIXED HYPERLIPIDEMIA: ICD-10-CM

## 2024-07-29 DIAGNOSIS — J44.9 OBSTRUCTIVE LUNG DISEASE (GENERALIZED) (HCC): ICD-10-CM

## 2024-07-29 DIAGNOSIS — M43.10 SPONDYLOLISTHESIS, UNSPECIFIED SPINAL REGION: ICD-10-CM

## 2024-07-29 DIAGNOSIS — J44.9 CHRONIC OBSTRUCTIVE PULMONARY DISEASE, UNSPECIFIED COPD TYPE (HCC): ICD-10-CM

## 2024-07-29 DIAGNOSIS — R73.03 PREDIABETES: Primary | ICD-10-CM

## 2024-07-29 PROBLEM — R06.09 DYSPNEA ON EXERTION: Status: RESOLVED | Noted: 2022-09-27 | Resolved: 2024-07-29

## 2024-07-29 PROBLEM — M25.522 LEFT ELBOW PAIN: Status: RESOLVED | Noted: 2018-09-20 | Resolved: 2024-07-29

## 2024-07-29 PROBLEM — M79.672 LEFT FOOT PAIN: Status: RESOLVED | Noted: 2018-09-20 | Resolved: 2024-07-29

## 2024-07-29 PROBLEM — D36.13 NEUROMA OF FOOT: Status: RESOLVED | Noted: 2021-11-21 | Resolved: 2024-07-29

## 2024-07-29 PROBLEM — R20.0 RIGHT LEG NUMBNESS: Status: RESOLVED | Noted: 2022-09-02 | Resolved: 2024-07-29

## 2024-07-29 PROBLEM — R94.2 ABNORMAL PULMONARY FUNCTION: Status: RESOLVED | Noted: 2022-09-27 | Resolved: 2024-07-29

## 2024-07-29 PROBLEM — N41.0 PROSTATITIS, ACUTE: Status: RESOLVED | Noted: 2018-07-06 | Resolved: 2024-07-29

## 2024-07-29 PROCEDURE — 3023F SPIROM DOC REV: CPT | Performed by: NURSE PRACTITIONER

## 2024-07-29 PROCEDURE — 99214 OFFICE O/P EST MOD 30 MIN: CPT | Performed by: NURSE PRACTITIONER

## 2024-07-29 PROCEDURE — 1036F TOBACCO NON-USER: CPT | Performed by: NURSE PRACTITIONER

## 2024-07-29 PROCEDURE — 3017F COLORECTAL CA SCREEN DOC REV: CPT | Performed by: NURSE PRACTITIONER

## 2024-07-29 PROCEDURE — 1123F ACP DISCUSS/DSCN MKR DOCD: CPT | Performed by: NURSE PRACTITIONER

## 2024-07-29 PROCEDURE — G8419 CALC BMI OUT NRM PARAM NOF/U: HCPCS | Performed by: NURSE PRACTITIONER

## 2024-07-29 PROCEDURE — G8427 DOCREV CUR MEDS BY ELIG CLIN: HCPCS | Performed by: NURSE PRACTITIONER

## 2024-07-29 RX ORDER — ALBUTEROL SULFATE 90 UG/1
2 AEROSOL, METERED RESPIRATORY (INHALATION) EVERY 6 HOURS PRN
Qty: 18 G | Refills: 2 | Status: SHIPPED | OUTPATIENT
Start: 2024-07-29

## 2024-07-29 RX ORDER — FLUTICASONE PROPIONATE AND SALMETEROL 250; 50 UG/1; UG/1
1 POWDER RESPIRATORY (INHALATION) EVERY 12 HOURS
Qty: 60 EACH | Refills: 10 | Status: SHIPPED | OUTPATIENT
Start: 2024-07-29

## 2024-07-29 RX ORDER — IBUPROFEN 800 MG/1
800 TABLET ORAL EVERY 8 HOURS PRN
Qty: 21 TABLET | Refills: 0 | Status: SHIPPED | OUTPATIENT
Start: 2024-07-29

## 2024-07-29 SDOH — ECONOMIC STABILITY: FOOD INSECURITY: WITHIN THE PAST 12 MONTHS, THE FOOD YOU BOUGHT JUST DIDN'T LAST AND YOU DIDN'T HAVE MONEY TO GET MORE.: NEVER TRUE

## 2024-07-29 SDOH — ECONOMIC STABILITY: FOOD INSECURITY: WITHIN THE PAST 12 MONTHS, YOU WORRIED THAT YOUR FOOD WOULD RUN OUT BEFORE YOU GOT MONEY TO BUY MORE.: NEVER TRUE

## 2024-07-29 SDOH — ECONOMIC STABILITY: INCOME INSECURITY: HOW HARD IS IT FOR YOU TO PAY FOR THE VERY BASICS LIKE FOOD, HOUSING, MEDICAL CARE, AND HEATING?: NOT HARD AT ALL

## 2024-07-29 ASSESSMENT — ENCOUNTER SYMPTOMS
COUGH: 1
BACK PAIN: 1
EYES NEGATIVE: 1
SHORTNESS OF BREATH: 1
GASTROINTESTINAL NEGATIVE: 1

## 2024-07-29 ASSESSMENT — PATIENT HEALTH QUESTIONNAIRE - PHQ9
1. LITTLE INTEREST OR PLEASURE IN DOING THINGS: NOT AT ALL
SUM OF ALL RESPONSES TO PHQ QUESTIONS 1-9: 0
1. LITTLE INTEREST OR PLEASURE IN DOING THINGS: NOT AT ALL
SUM OF ALL RESPONSES TO PHQ9 QUESTIONS 1 & 2: 0
2. FEELING DOWN, DEPRESSED OR HOPELESS: NOT AT ALL
SUM OF ALL RESPONSES TO PHQ QUESTIONS 1-9: 0
SUM OF ALL RESPONSES TO PHQ9 QUESTIONS 1 & 2: 0
2. FEELING DOWN, DEPRESSED OR HOPELESS: NOT AT ALL

## 2024-07-29 NOTE — PROGRESS NOTES
every 6 hours as needed for Wheezing or Shortness of Breath, Disp-18 g, R-2Normal  -     fluticasone-salmeterol (ADVAIR DISKUS) 250-50 MCG/ACT AEPB diskus inhaler; Inhale 1 puff into the lungs in the morning and 1 puff in the evening., Disp-60 each, R-10Normal  8. Obstructive lung disease (generalized) ([facility])     Recommended healthy BMI, increase water intake, increase fiber in diet, and increase protein; as well as stay active, stress reduction, and adequate sleep.  Consider weight loss plan.    Greater than 50% counseling and/or coordination of care: the treatment regimen is extensive; detailed review.    Patient informed, we will call with blood work results within one week.  If you have not heard regarding results in over a week, please contact office.  You can also review results on [employer].       Follow-up and Dispositions    Return for AWV with wife next few month.         Hazel Boo, EUGENIE - CNP

## 2024-07-29 NOTE — PATIENT INSTRUCTIONS
poultry and eggs.  Choose healthy (unsaturated) fats, such as nuts, olive oil, and certain nut or seed oils like canola, soybean, and flaxseed.  Limit unhealthy (saturated) fats, such as butter, palm oil, and coconut oil. And limit fats found in animal products, such as meat and dairy products made with whole milk. Try to eat red meat only a few times a month in very small amounts.  Limit sweets and desserts to only a few times a week. This includes sugar-sweetened drinks like soda.  The Mediterranean diet may also include red wine with your meal--1 glass each day for women and up to 2 glasses a day for men.  Tips for eating at home  Use herbs, spices, garlic, lemon zest, and citrus juice instead of salt to add flavor to foods.  Add avocado slices to your sandwich instead of seaman.  Have fish for lunch or dinner instead of red meat. Brush the fish with olive oil, and broil or grill it.  Sprinkle your salad with seeds or nuts instead of cheese.  Cook with olive or canola oil instead of butter or oils that are high in saturated fat.  Switch from 2% milk or whole milk to 1% or fat-free milk.  Dip raw vegetables in a vinaigrette dressing or hummus instead of dips made from mayonnaise or sour cream.  Have a piece of fruit for dessert instead of a piece of cake. Try baked apples, or have some dried fruit.  Tips for eating out  Try broiled, grilled, baked, or poached fish instead of having it fried or breaded.  Ask your  to have your meals prepared with olive oil instead of butter.  Order dishes made with marinara sauce or sauces made from olive oil. Avoid sauces made from cream or mayonnaise.  Choose whole-grain breads, whole wheat pasta and pizza crust, brown rice, beans, and lentils.  Cut back on butter or margarine on bread. Instead, you can dip your bread in a small amount of olive oil.  Ask for a side salad or grilled vegetables instead of french fries or chips.  Where can you learn more?  Go to

## 2024-08-25 SDOH — HEALTH STABILITY: PHYSICAL HEALTH: ON AVERAGE, HOW MANY DAYS PER WEEK DO YOU ENGAGE IN MODERATE TO STRENUOUS EXERCISE (LIKE A BRISK WALK)?: 2 DAYS

## 2024-08-25 SDOH — HEALTH STABILITY: PHYSICAL HEALTH: ON AVERAGE, HOW MANY MINUTES DO YOU ENGAGE IN EXERCISE AT THIS LEVEL?: 20 MIN

## 2024-08-25 ASSESSMENT — PATIENT HEALTH QUESTIONNAIRE - PHQ9
SUM OF ALL RESPONSES TO PHQ QUESTIONS 1-9: 0
2. FEELING DOWN, DEPRESSED OR HOPELESS: NOT AT ALL
SUM OF ALL RESPONSES TO PHQ9 QUESTIONS 1 & 2: 0
1. LITTLE INTEREST OR PLEASURE IN DOING THINGS: NOT AT ALL
SUM OF ALL RESPONSES TO PHQ QUESTIONS 1-9: 0

## 2024-08-25 ASSESSMENT — LIFESTYLE VARIABLES
HOW OFTEN DO YOU HAVE A DRINK CONTAINING ALCOHOL: 4
HOW OFTEN DO YOU HAVE SIX OR MORE DRINKS ON ONE OCCASION: 1
HOW MANY STANDARD DRINKS CONTAINING ALCOHOL DO YOU HAVE ON A TYPICAL DAY: 1 OR 2
HOW MANY STANDARD DRINKS CONTAINING ALCOHOL DO YOU HAVE ON A TYPICAL DAY: 1
HOW OFTEN DO YOU HAVE A DRINK CONTAINING ALCOHOL: 2-3 TIMES A WEEK

## 2024-08-28 ENCOUNTER — OFFICE VISIT (OUTPATIENT)
Dept: FAMILY MEDICINE CLINIC | Facility: CLINIC | Age: 64
End: 2024-08-28
Payer: MEDICARE

## 2024-08-28 VITALS
HEART RATE: 80 BPM | SYSTOLIC BLOOD PRESSURE: 100 MMHG | WEIGHT: 227 LBS | DIASTOLIC BLOOD PRESSURE: 66 MMHG | OXYGEN SATURATION: 98 % | BODY MASS INDEX: 29.15 KG/M2 | TEMPERATURE: 98.3 F

## 2024-08-28 DIAGNOSIS — Z00.00 MEDICARE ANNUAL WELLNESS VISIT, SUBSEQUENT: Primary | ICD-10-CM

## 2024-08-28 PROCEDURE — 3017F COLORECTAL CA SCREEN DOC REV: CPT | Performed by: NURSE PRACTITIONER

## 2024-08-28 PROCEDURE — G0439 PPPS, SUBSEQ VISIT: HCPCS | Performed by: NURSE PRACTITIONER

## 2024-08-28 PROCEDURE — 1123F ACP DISCUSS/DSCN MKR DOCD: CPT | Performed by: NURSE PRACTITIONER

## 2024-08-28 ASSESSMENT — VISUAL ACUITY
OS_CC: 20/25
OD_CC: 20/20

## 2024-08-28 NOTE — PATIENT INSTRUCTIONS
Learning About Being Active as an Older Adult  Why is being active important as you get older?     Being active is one of the best things you can do for your health. And it's never too late to start. Being active--or getting active, if you aren't already--has definite benefits. It can:  Give you more energy,  Keep your mind sharp.  Improve balance to reduce your risk of falls.  Help you manage chronic illness with fewer medicines.  No matter how old you are, how fit you are, or what health problems you have, there is a form of activity that will work for you. And the more physical activity you can do, the better your overall health will be.  What kinds of activity can help you stay healthy?  Being more active will make your daily activities easier. Physical activity includes planned exercise and things you do in daily life. There are four types of activity:  Aerobic.  Doing aerobic activity makes your heart and lungs strong.  Includes walking, dancing, and gardening.  Aim for at least 2½ hours spread throughout the week.  It improves your energy and can help you sleep better.  Muscle-strengthening.  This type of activity can help maintain muscle and strengthen bones.  Includes climbing stairs, using resistance bands, and lifting or carrying heavy loads.  Aim for at least twice a week.  It can help protect the knees and other joints.  Stretching.  Stretching gives you better range of motion in joints and muscles.  Includes upper arm stretches, calf stretches, and gentle yoga.  Aim for at least twice a week, preferably after your muscles are warmed up from other activities.  It can help you function better in daily life.  Balancing.  This helps you stay coordinated and have good posture.  Includes heel-to-toe walking, kailyn chi, and certain types of yoga.  Aim for at least 3 days a week.  It can reduce your risk of falling.  Even if you have a hard time meeting the recommendations, it's better to be more active  months.  Try to get at least 150 minutes of exercise per week or 10,000 steps per day on a pedometer .  Order or download the FREE \"Exercise & Physical Activity: Your Everyday Guide\" from [employer]. Call 1-636.195.1636 or search [employer] online.  You need 3875-9882 mg of calcium and 1659-7134 IU of vitamin D per day. It is possible to meet your calcium requirement with diet alone, but a vitamin D supplement is usually necessary to meet this goal.  When exposed to the sun, use a sunscreen that protects against both UVA and UVB radiation with an SPF of 30 or greater. Reapply every 2 to 3 hours or after sweating, drying off with a towel, or swimming.  Always wear a seat belt when traveling in a car. Always wear a helmet when riding a bicycle or motorcycle.

## 2024-08-28 NOTE — PROGRESS NOTES
Medicare Annual Wellness Visit    Shun South is here for Medicare AWV (Pt presents today for AWV.)    Assessment & Plan   Medicare annual wellness visit, subsequent  Recommendations for Preventive Services Due: see orders and patient instructions/AVS.  Recommended screening schedule for the next 5-10 years is provided to the patient in written form: see Patient Instructions/AVS.     Return in 5 months (on 1/28/2025) for prediabetes, Routine Visit, Labs.     Subjective   AWV.  40 years 3 years.    Patient's complete Health Risk Assessment and screening values have been reviewed and are found in Flowsheets. The following problems were reviewed today and where indicated follow up appointments were made and/or referrals ordered.    Positive Risk Factor Screenings with Interventions:                Inactivity:  On average, how many days per week do you engage in moderate to strenuous exercise (like a brisk walk)?: 2 days (!) Abnormal  On average, how many minutes do you engage in exercise at this level?: 20 min  Interventions:  See AVS for additional education material       Hearing Screen:  Do you or your family notice any trouble with your hearing that hasn't been managed with hearing aids?: (!) Yes    Interventions:  Has previously been screened and offered hearing aides.  See AVS for additional education material    Vision Screen:  Visual Acuity screen is abnormal due to a score of 20/25 or worse.  Interventions:   Patient encouraged to make appointment with their eye specialist  See AVS for additional education material      Advanced Directives:  Do you have a Living Will?: (!) No    Intervention:  has NO advanced directive - not interested in additional information                     Objective   Vision Screening    Right eye Left eye Both eyes   Without correction      With correction 20/20 20/25 20/20      Vitals:    08/28/24 1448   BP: 100/66   Pulse: 80   Temp: 98.3 °F (36.8 °C)   SpO2: 98%   Weight: 103

## 2024-12-06 ENCOUNTER — TELEPHONE (OUTPATIENT)
Dept: FAMILY MEDICINE CLINIC | Facility: CLINIC | Age: 64
End: 2024-12-06

## 2025-01-28 ENCOUNTER — OFFICE VISIT (OUTPATIENT)
Dept: FAMILY MEDICINE CLINIC | Facility: CLINIC | Age: 65
End: 2025-01-28
Payer: MEDICARE

## 2025-01-28 VITALS
DIASTOLIC BLOOD PRESSURE: 60 MMHG | TEMPERATURE: 98.2 F | WEIGHT: 200 LBS | HEART RATE: 70 BPM | BODY MASS INDEX: 25.68 KG/M2 | OXYGEN SATURATION: 98 % | SYSTOLIC BLOOD PRESSURE: 100 MMHG

## 2025-01-28 DIAGNOSIS — Z13.6 ENCOUNTER FOR ABDOMINAL AORTIC ANEURYSM (AAA) SCREENING: ICD-10-CM

## 2025-01-28 DIAGNOSIS — R73.03 PREDIABETES: ICD-10-CM

## 2025-01-28 DIAGNOSIS — J44.9 CHRONIC OBSTRUCTIVE PULMONARY DISEASE, UNSPECIFIED COPD TYPE (HCC): Primary | ICD-10-CM

## 2025-01-28 DIAGNOSIS — M43.10 SPONDYLOLISTHESIS, UNSPECIFIED SPINAL REGION: ICD-10-CM

## 2025-01-28 DIAGNOSIS — N40.0 BENIGN PROSTATIC HYPERPLASIA WITHOUT LOWER URINARY TRACT SYMPTOMS: ICD-10-CM

## 2025-01-28 DIAGNOSIS — R82.998 DARK URINE: ICD-10-CM

## 2025-01-28 DIAGNOSIS — Z78.9 FAMILY HISTORY UNKNOWN: ICD-10-CM

## 2025-01-28 DIAGNOSIS — Z91.89 AT RISK FOR CARDIOVASCULAR EVENT: ICD-10-CM

## 2025-01-28 DIAGNOSIS — E78.2 MIXED HYPERLIPIDEMIA: ICD-10-CM

## 2025-01-28 DIAGNOSIS — Z87.891 PERSONAL HISTORY OF TOBACCO USE, PRESENTING HAZARDS TO HEALTH: ICD-10-CM

## 2025-01-28 LAB — HBA1C MFR BLD: 5.9 %

## 2025-01-28 PROCEDURE — 1123F ACP DISCUSS/DSCN MKR DOCD: CPT | Performed by: NURSE PRACTITIONER

## 2025-01-28 PROCEDURE — 3023F SPIROM DOC REV: CPT | Performed by: NURSE PRACTITIONER

## 2025-01-28 PROCEDURE — G8427 DOCREV CUR MEDS BY ELIG CLIN: HCPCS | Performed by: NURSE PRACTITIONER

## 2025-01-28 PROCEDURE — 83036 HEMOGLOBIN GLYCOSYLATED A1C: CPT | Performed by: NURSE PRACTITIONER

## 2025-01-28 PROCEDURE — 3017F COLORECTAL CA SCREEN DOC REV: CPT | Performed by: NURSE PRACTITIONER

## 2025-01-28 PROCEDURE — G8419 CALC BMI OUT NRM PARAM NOF/U: HCPCS | Performed by: NURSE PRACTITIONER

## 2025-01-28 PROCEDURE — 1036F TOBACCO NON-USER: CPT | Performed by: NURSE PRACTITIONER

## 2025-01-28 PROCEDURE — 1159F MED LIST DOCD IN RCRD: CPT | Performed by: NURSE PRACTITIONER

## 2025-01-28 PROCEDURE — 99214 OFFICE O/P EST MOD 30 MIN: CPT | Performed by: NURSE PRACTITIONER

## 2025-01-28 RX ORDER — TAMSULOSIN HYDROCHLORIDE 0.4 MG/1
0.4 CAPSULE ORAL DAILY
Qty: 90 CAPSULE | Refills: 1 | Status: SHIPPED | OUTPATIENT
Start: 2025-01-28

## 2025-01-28 RX ORDER — IBUPROFEN 800 MG/1
800 TABLET, FILM COATED ORAL EVERY 8 HOURS PRN
Qty: 21 TABLET | Refills: 3 | Status: SHIPPED | OUTPATIENT
Start: 2025-01-28

## 2025-01-28 SDOH — ECONOMIC STABILITY: FOOD INSECURITY: WITHIN THE PAST 12 MONTHS, YOU WORRIED THAT YOUR FOOD WOULD RUN OUT BEFORE YOU GOT MONEY TO BUY MORE.: NEVER TRUE

## 2025-01-28 SDOH — ECONOMIC STABILITY: FOOD INSECURITY: WITHIN THE PAST 12 MONTHS, THE FOOD YOU BOUGHT JUST DIDN'T LAST AND YOU DIDN'T HAVE MONEY TO GET MORE.: NEVER TRUE

## 2025-01-28 ASSESSMENT — ENCOUNTER SYMPTOMS
BACK PAIN: 1
RESPIRATORY NEGATIVE: 1

## 2025-01-28 ASSESSMENT — PATIENT HEALTH QUESTIONNAIRE - PHQ9
2. FEELING DOWN, DEPRESSED OR HOPELESS: NOT AT ALL
1. LITTLE INTEREST OR PLEASURE IN DOING THINGS: NOT AT ALL
SUM OF ALL RESPONSES TO PHQ QUESTIONS 1-9: 0
SUM OF ALL RESPONSES TO PHQ9 QUESTIONS 1 & 2: 0
SUM OF ALL RESPONSES TO PHQ QUESTIONS 1-9: 0

## 2025-01-28 NOTE — ASSESSMENT & PLAN NOTE
Will recheck lipid at f/u in July.  Had recent panel with [facility]; obtain records.  Reviewed CV risk intermediate; unknown family hx/adopted.  Interested in coronary calcium scoring for CV risk assessment for lipid management.  Weight loss and exercise as well as healthy diet congratulated on efforts.

## 2025-01-28 NOTE — PROGRESS NOTES
Shun South is a 64 y.o. male who presents today for the following:  Chief Complaint   Patient presents with    Follow-up     Pt presents today for FU.        Allergies   Allergen Reactions    Penicillins Anaphylaxis     Childhood allergy per patient severe reaction           Current Outpatient Medications   Medication Sig Dispense Refill    ibuprofen (ADVIL;MOTRIN) 800 MG tablet Take 1 tablet by mouth every 8 hours as needed for Pain Take sparingly and with food. 21 tablet 3    tamsulosin (FLOMAX) 0.4 MG capsule Take 1 capsule by mouth daily 90 capsule 1    albuterol sulfate HFA (PROVENTIL;VENTOLIN;PROAIR) 108 (90 Base) MCG/ACT inhaler Inhale 2 puffs into the lungs every 6 hours as needed for Wheezing or Shortness of Breath 18 g 2    fluticasone-salmeterol (ADVAIR DISKUS) 250-50 MCG/ACT AEPB diskus inhaler Inhale 1 puff into the lungs in the morning and 1 puff in the evening. 60 each 10    Handicap Placard MISC by Does not apply route 1 each 0    cyclobenzaprine (FLEXERIL) 10 MG tablet Take 1 tablet by mouth 2 times daily as needed for Muscle spasms 14 tablet 0     No current facility-administered medications for this visit.       Past Medical History:   Diagnosis Date    Benign prostatic hyperplasia with weak urinary stream     Chronic back pain L4/L5    Chronic pain of left knee     Chronic pain of right knee     COPD (chronic obstructive pulmonary disease) ([facility])     Hearing loss     Kidney stone     Kidney stone     2018 passes spontaneously    Neuroma of foot 11/21/2021    Neuropathy Right Foot    PAC (premature atrial contraction)     Prostatitis     x2 episodes, previously evaluated by urology    Prostatitis, acute 07/06/2018    PVC (premature ventricular contraction)     Right leg numbness 09/02/2022    Tinnitus        Past Surgical History:   Procedure Laterality Date    COLONOSCOPY      JOINT REPLACEMENT Right     November 2021    SINUS ENDOSCOPY      SPINE SURGERY      2015 L5 and S1       Social

## 2025-02-20 ENCOUNTER — OFFICE VISIT (OUTPATIENT)
Dept: FAMILY MEDICINE CLINIC | Facility: CLINIC | Age: 65
End: 2025-02-20
Payer: MEDICARE

## 2025-02-20 VITALS
HEART RATE: 75 BPM | DIASTOLIC BLOOD PRESSURE: 72 MMHG | OXYGEN SATURATION: 98 % | HEIGHT: 74 IN | SYSTOLIC BLOOD PRESSURE: 118 MMHG | TEMPERATURE: 98 F | RESPIRATION RATE: 14 BRPM | BODY MASS INDEX: 25.68 KG/M2

## 2025-02-20 DIAGNOSIS — J32.0 RIGHT MAXILLARY SINUSITIS: Primary | ICD-10-CM

## 2025-02-20 PROCEDURE — 1160F RVW MEDS BY RX/DR IN RCRD: CPT | Performed by: PHYSICIAN ASSISTANT

## 2025-02-20 PROCEDURE — 1036F TOBACCO NON-USER: CPT | Performed by: PHYSICIAN ASSISTANT

## 2025-02-20 PROCEDURE — 3017F COLORECTAL CA SCREEN DOC REV: CPT | Performed by: PHYSICIAN ASSISTANT

## 2025-02-20 PROCEDURE — 99213 OFFICE O/P EST LOW 20 MIN: CPT | Performed by: PHYSICIAN ASSISTANT

## 2025-02-20 PROCEDURE — 1123F ACP DISCUSS/DSCN MKR DOCD: CPT | Performed by: PHYSICIAN ASSISTANT

## 2025-02-20 PROCEDURE — G8427 DOCREV CUR MEDS BY ELIG CLIN: HCPCS | Performed by: PHYSICIAN ASSISTANT

## 2025-02-20 PROCEDURE — 1159F MED LIST DOCD IN RCRD: CPT | Performed by: PHYSICIAN ASSISTANT

## 2025-02-20 PROCEDURE — G8419 CALC BMI OUT NRM PARAM NOF/U: HCPCS | Performed by: PHYSICIAN ASSISTANT

## 2025-02-20 RX ORDER — GUAIFENESIN 600 MG/1
1200 TABLET, EXTENDED RELEASE ORAL 2 TIMES DAILY PRN
Qty: 60 TABLET | Refills: 0 | Status: SHIPPED | OUTPATIENT
Start: 2025-02-20

## 2025-02-20 RX ORDER — IBUPROFEN 800 MG/1
800 TABLET, FILM COATED ORAL EVERY 8 HOURS PRN
Qty: 21 TABLET | Refills: 3 | Status: SHIPPED | OUTPATIENT
Start: 2025-02-20

## 2025-02-20 RX ORDER — CETIRIZINE HYDROCHLORIDE 10 MG/1
10 TABLET ORAL DAILY PRN
Qty: 60 TABLET | Refills: 0 | Status: SHIPPED | OUTPATIENT
Start: 2025-02-20

## 2025-02-20 RX ORDER — DOXYCYCLINE HYCLATE 100 MG
100 TABLET ORAL 2 TIMES DAILY
Qty: 14 TABLET | Refills: 0 | Status: SHIPPED | OUTPATIENT
Start: 2025-02-20 | End: 2025-02-27

## 2025-02-20 NOTE — PROGRESS NOTES
Susie Armendariz PA-C, [facility] [place]  [facility]  [address], [address]  [place] [address]  Phone [phone]    SUBJECTIVE  Chief Complaint   Patient presents with    Facial Pain     Right sided sinus pressure and constant headache with pain radiating down neck lasting 1.5 weeks. Recently saw dentist and no dental issues. [facility] and [facility] loratadine and flonase, not helping      HPI   Shun South is a 64 y.o. male with PMH as below presents for an acute visit.     Pt complains of sinus pressure on R frontal and R maxillary sinus and behind his R cheek x 1.5 weeks. States he pain in his R upper jaw as well. It will radiate down his R lateral neck when it is severe. States he has a constant headache that sits on the top of his head on both sides, and feels like squeezing. States he sometimes has a \"light numbness\" and tingling on the R side of his nose and over his R maxillary sinus-- these symptoms come and go. His ears are not hurting. Denies nasal congestion. States his symptoms are interfering with his sleep.    States [facility] and was prescribed loratadine and flonase. He is taking these daily without relief. He has not tried mucinex or sinus rinses.    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 States he is coughing a little more than normal. Cough is not usually productive. Denies wheezing or SOB.     States he had his teeth cleaned and had x-rays last week and was told his pain was not from a dental issue.     Denies fall or head trauma. 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     Review of Systems -- denies fever, chills, nausea, vomiting, nuchal rigidity, phonophobia. States since he developed sinus pressure, he has increased photophobia with headlights at night. No

## 2025-05-12 ENCOUNTER — OFFICE VISIT (OUTPATIENT)
Dept: FAMILY MEDICINE CLINIC | Facility: CLINIC | Age: 65
End: 2025-05-12

## 2025-05-12 VITALS
BODY MASS INDEX: 25.03 KG/M2 | RESPIRATION RATE: 14 BRPM | OXYGEN SATURATION: 97 % | SYSTOLIC BLOOD PRESSURE: 96 MMHG | TEMPERATURE: 98.3 F | DIASTOLIC BLOOD PRESSURE: 62 MMHG | HEART RATE: 80 BPM | WEIGHT: 195 LBS | HEIGHT: 74 IN

## 2025-05-12 DIAGNOSIS — M25.551 CHRONIC RIGHT HIP PAIN: Primary | ICD-10-CM

## 2025-05-12 DIAGNOSIS — G89.29 CHRONIC RIGHT HIP PAIN: Primary | ICD-10-CM

## 2025-05-12 RX ORDER — IBUPROFEN 800 MG/1
800 TABLET, FILM COATED ORAL EVERY 8 HOURS PRN
Qty: 60 TABLET | Refills: 0 | Status: SHIPPED | OUTPATIENT
Start: 2025-05-12

## 2025-05-12 NOTE — PROGRESS NOTES
Susie Armendariz PA-C, [facility] [place]  [facility]  [address], [address]  [place] [address]  Phone [phone]    SUBJECTIVE  Chief Complaint   Patient presents with    Hip Pain     Right x 2 months     HPI   Shun South is a 64 y.o. male with PMH as below presents for an acute visit.     Pt reports right hip pain x 2-3 months. States it will radiate down his R thigh. It is worse when he is outside working on [employer]. It feels like \"something sticking me\" in his R gluteus and then radiates down his R thigh. States the pain feels deep in his bones. Pain is alleviated by resting.     States at baseline he has numbness in R lateral leg as well as numbness to plantar surface of R foot since prior to 2015. Denies new numbness/ tingling of the legs. Denies right leg weakness. Pain limits his leg strength at times.  Denies bowel or bladder changes, saddle anesthesia, fever/ chills, or leg weakness.     For his pain, has been resting. Ibuprofen 800mg does help with the pain. Sometimes takes 2 ibuprofen 800mg at a time. He has been taking 2-3 tabs of tylenol 650mg at a time with relief. He has flexeril 10mg but does not like taking it due to sedation.    States he has intentionally lost about 40 pounds since last fall. He had L5-S1 fusion in 2015.     Takes flomax PRN for incomplete emptying.    Review of Systems states he sometimes get dizzy if he stands up quickly or moves too fast; this lasts a few seconds and then goes away.     Patient Active Problem List   Diagnosis    Chronic low back pain without sciatica    Osteoarthritis of right knee    Prediabetes    Spondylolisthesis    Numbness of right foot    Chronic obstructive lung disease (HCC)    Mixed hyperlipidemia    Overweight (BMI 25.0-29.9)      Social History     Tobacco Use    Smoking status: Former     Current packs/day: 0.00     Average packs/day: 1 pack/day for 2.0 years (2.0 ttl pk-yrs)     Types: Cigarettes     Start

## 2025-05-15 ENCOUNTER — HOSPITAL ENCOUNTER (OUTPATIENT)
Dept: GENERAL RADIOLOGY | Age: 65
Discharge: HOME | End: 2025-05-17
Payer: MEDICARE

## 2025-05-15 DIAGNOSIS — M25.551 CHRONIC RIGHT HIP PAIN: ICD-10-CM

## 2025-05-15 DIAGNOSIS — G89.29 CHRONIC RIGHT HIP PAIN: ICD-10-CM

## 2025-05-15 PROCEDURE — 73502 X-RAY EXAM HIP UNI 2-3 VIEWS: CPT

## 2025-05-15 PROCEDURE — 72100 X-RAY EXAM L-S SPINE 2/3 VWS: CPT

## 2025-05-16 ENCOUNTER — RESULTS FOLLOW-UP (OUTPATIENT)
Dept: FAMILY MEDICINE CLINIC | Facility: CLINIC | Age: 65
End: 2025-05-16

## 2025-05-17 ENCOUNTER — RESULTS FOLLOW-UP (OUTPATIENT)
Dept: FAMILY MEDICINE CLINIC | Facility: CLINIC | Age: 65
End: 2025-05-17

## 2025-09-02 SDOH — HEALTH STABILITY: PHYSICAL HEALTH: ON AVERAGE, HOW MANY DAYS PER WEEK DO YOU ENGAGE IN MODERATE TO STRENUOUS EXERCISE (LIKE A BRISK WALK)?: 4 DAYS

## 2025-09-02 SDOH — HEALTH STABILITY: PHYSICAL HEALTH: ON AVERAGE, HOW MANY MINUTES DO YOU ENGAGE IN EXERCISE AT THIS LEVEL?: 20 MIN

## 2025-09-02 ASSESSMENT — PATIENT HEALTH QUESTIONNAIRE - PHQ9
1. LITTLE INTEREST OR PLEASURE IN DOING THINGS: NOT AT ALL
SUM OF ALL RESPONSES TO PHQ QUESTIONS 1-9: 0
2. FEELING DOWN, DEPRESSED OR HOPELESS: NOT AT ALL
SUM OF ALL RESPONSES TO PHQ QUESTIONS 1-9: 0

## 2025-09-02 ASSESSMENT — LIFESTYLE VARIABLES
HOW MANY STANDARD DRINKS CONTAINING ALCOHOL DO YOU HAVE ON A TYPICAL DAY: 1
HOW OFTEN DO YOU HAVE SIX OR MORE DRINKS ON ONE OCCASION: 1
HOW MANY STANDARD DRINKS CONTAINING ALCOHOL DO YOU HAVE ON A TYPICAL DAY: 1 OR 2
HOW OFTEN DO YOU HAVE A DRINK CONTAINING ALCOHOL: 2-3 TIMES A WEEK
HOW OFTEN DO YOU HAVE A DRINK CONTAINING ALCOHOL: 4

## 2025-09-03 ENCOUNTER — OFFICE VISIT (OUTPATIENT)
Dept: FAMILY MEDICINE CLINIC | Facility: CLINIC | Age: 65
End: 2025-09-03
Payer: MEDICARE

## 2025-09-03 VITALS
WEIGHT: 198 LBS | OXYGEN SATURATION: 98 % | TEMPERATURE: 98.1 F | BODY MASS INDEX: 25.42 KG/M2 | HEART RATE: 68 BPM | SYSTOLIC BLOOD PRESSURE: 102 MMHG | DIASTOLIC BLOOD PRESSURE: 62 MMHG

## 2025-09-03 DIAGNOSIS — B07.9 WART OF HAND: ICD-10-CM

## 2025-09-03 DIAGNOSIS — E78.2 MIXED HYPERLIPIDEMIA: ICD-10-CM

## 2025-09-03 DIAGNOSIS — J44.9 CHRONIC OBSTRUCTIVE PULMONARY DISEASE, UNSPECIFIED COPD TYPE (HCC): Primary | ICD-10-CM

## 2025-09-03 DIAGNOSIS — G89.29 CHRONIC LEFT HIP PAIN: ICD-10-CM

## 2025-09-03 DIAGNOSIS — R22.9 MULTIPLE SKIN NODULES: ICD-10-CM

## 2025-09-03 DIAGNOSIS — N40.0 BENIGN PROSTATIC HYPERPLASIA WITHOUT LOWER URINARY TRACT SYMPTOMS: ICD-10-CM

## 2025-09-03 DIAGNOSIS — E66.3 OVERWEIGHT (BMI 25.0-29.9): ICD-10-CM

## 2025-09-03 DIAGNOSIS — Z00.00 INITIAL MEDICARE ANNUAL WELLNESS VISIT: ICD-10-CM

## 2025-09-03 DIAGNOSIS — M25.552 CHRONIC LEFT HIP PAIN: ICD-10-CM

## 2025-09-03 DIAGNOSIS — R73.03 PREDIABETES: ICD-10-CM

## 2025-09-03 DIAGNOSIS — Z12.5 PROSTATE CANCER SCREENING: ICD-10-CM

## 2025-09-03 DIAGNOSIS — Z12.83 SKIN CANCER SCREENING: ICD-10-CM

## 2025-09-03 DIAGNOSIS — L91.8 SKIN TAG: ICD-10-CM

## 2025-09-03 LAB
ALBUMIN SERPL-MCNC: 4.2 G/DL (ref 3.2–4.6)
ALBUMIN/GLOB SERPL: 1.5 (ref 1–1.9)
ALP SERPL-CCNC: 53 U/L (ref 40–129)
ALT SERPL-CCNC: 22 U/L (ref 8–55)
ANION GAP SERPL CALC-SCNC: 10 MMOL/L (ref 7–16)
APPEARANCE UR: CLEAR
AST SERPL-CCNC: 22 U/L (ref 15–37)
BACTERIA URNS QL MICRO: NEGATIVE /HPF
BASOPHILS # BLD: 0.06 K/UL (ref 0–0.2)
BASOPHILS NFR BLD: 1 % (ref 0–2)
BILIRUB SERPL-MCNC: 0.6 MG/DL (ref 0–1.2)
BILIRUB UR QL: NEGATIVE
BUN SERPL-MCNC: 19 MG/DL (ref 8–23)
CALCIUM SERPL-MCNC: 9.7 MG/DL (ref 8.8–10.2)
CHLORIDE SERPL-SCNC: 104 MMOL/L (ref 98–107)
CHOLEST SERPL-MCNC: 193 MG/DL (ref 0–200)
CO2 SERPL-SCNC: 28 MMOL/L (ref 20–29)
COLOR UR: ABNORMAL
CREAT SERPL-MCNC: 0.99 MG/DL (ref 0.8–1.3)
DIFFERENTIAL METHOD BLD: NORMAL
EOSINOPHIL # BLD: 0.15 K/UL (ref 0–0.8)
EOSINOPHIL NFR BLD: 2.6 % (ref 0.5–7.8)
EPI CELLS #/AREA URNS HPF: ABNORMAL /HPF (ref 0–5)
ERYTHROCYTE [DISTWIDTH] IN BLOOD BY AUTOMATED COUNT: 13 % (ref 11.9–14.6)
GLOBULIN SER CALC-MCNC: 2.9 G/DL (ref 2.3–3.5)
GLUCOSE SERPL-MCNC: 98 MG/DL (ref 70–99)
GLUCOSE UR STRIP.AUTO-MCNC: NEGATIVE MG/DL
HBA1C MFR BLD: 5.5 %
HCT VFR BLD AUTO: 42.4 % (ref 41.1–50.3)
HDLC SERPL-MCNC: 75 MG/DL (ref 40–60)
HDLC SERPL: 2.6 (ref 0–5)
HGB BLD-MCNC: 13.7 G/DL (ref 13.6–17.2)
HGB UR QL STRIP: NEGATIVE
HYALINE CASTS URNS QL MICRO: ABNORMAL /LPF
IMM GRANULOCYTES # BLD AUTO: 0.01 K/UL (ref 0–0.5)
IMM GRANULOCYTES NFR BLD AUTO: 0.2 % (ref 0–5)
KETONES UR QL STRIP.AUTO: NEGATIVE MG/DL
LDLC SERPL CALC-MCNC: 104 MG/DL (ref 0–100)
LEUKOCYTE ESTERASE UR QL STRIP.AUTO: ABNORMAL
LYMPHOCYTES # BLD: 1.6 K/UL (ref 0.5–4.6)
LYMPHOCYTES NFR BLD: 27.6 % (ref 13–44)
MCH RBC QN AUTO: 32.4 PG (ref 26.1–32.9)
MCHC RBC AUTO-ENTMCNC: 32.3 G/DL (ref 31.4–35)
MCV RBC AUTO: 100.2 FL (ref 82–102)
MONOCYTES # BLD: 0.34 K/UL (ref 0.1–1.3)
MONOCYTES NFR BLD: 5.9 % (ref 4–12)
NEUTS SEG # BLD: 3.63 K/UL (ref 1.7–8.2)
NEUTS SEG NFR BLD: 62.7 % (ref 43–78)
NITRITE UR QL STRIP.AUTO: NEGATIVE
NRBC # BLD: 0 K/UL (ref 0–0.2)
PH UR STRIP: 6.5 (ref 5–9)
PLATELET # BLD AUTO: 217 K/UL (ref 150–450)
PMV BLD AUTO: 11 FL (ref 9.4–12.3)
POTASSIUM SERPL-SCNC: 4.6 MMOL/L (ref 3.5–5.1)
PROT SERPL-MCNC: 7 G/DL (ref 6.3–8.2)
PROT UR STRIP-MCNC: NEGATIVE MG/DL
PSA SERPL-MCNC: 0.8 NG/ML (ref 0–4)
RBC # BLD AUTO: 4.23 M/UL (ref 4.23–5.6)
RBC #/AREA URNS HPF: ABNORMAL /HPF (ref 0–5)
SODIUM SERPL-SCNC: 143 MMOL/L (ref 136–145)
SP GR UR REFRACTOMETRY: 1.02 (ref 1–1.02)
TRIGL SERPL-MCNC: 72 MG/DL (ref 0–150)
URINE CULTURE IF INDICATED: ABNORMAL
UROBILINOGEN UR QL STRIP.AUTO: 0.2 EU/DL (ref 0.2–1)
VLDLC SERPL CALC-MCNC: 14 MG/DL (ref 6–23)
WBC # BLD AUTO: 5.8 K/UL (ref 4.3–11.1)
WBC URNS QL MICRO: ABNORMAL /HPF (ref 0–4)

## 2025-09-03 PROCEDURE — 3017F COLORECTAL CA SCREEN DOC REV: CPT | Performed by: NURSE PRACTITIONER

## 2025-09-03 PROCEDURE — 83036 HEMOGLOBIN GLYCOSYLATED A1C: CPT | Performed by: NURSE PRACTITIONER

## 2025-09-03 PROCEDURE — G8427 DOCREV CUR MEDS BY ELIG CLIN: HCPCS | Performed by: NURSE PRACTITIONER

## 2025-09-03 PROCEDURE — G8419 CALC BMI OUT NRM PARAM NOF/U: HCPCS | Performed by: NURSE PRACTITIONER

## 2025-09-03 PROCEDURE — G0438 PPPS, INITIAL VISIT: HCPCS | Performed by: NURSE PRACTITIONER

## 2025-09-03 PROCEDURE — 3023F SPIROM DOC REV: CPT | Performed by: NURSE PRACTITIONER

## 2025-09-03 PROCEDURE — 1123F ACP DISCUSS/DSCN MKR DOCD: CPT | Performed by: NURSE PRACTITIONER

## 2025-09-03 PROCEDURE — 1036F TOBACCO NON-USER: CPT | Performed by: NURSE PRACTITIONER

## 2025-09-03 PROCEDURE — 99214 OFFICE O/P EST MOD 30 MIN: CPT | Performed by: NURSE PRACTITIONER

## 2025-09-03 PROCEDURE — 1159F MED LIST DOCD IN RCRD: CPT | Performed by: NURSE PRACTITIONER

## 2025-09-03 RX ORDER — FLUTICASONE PROPIONATE AND SALMETEROL 250; 50 UG/1; UG/1
1 POWDER RESPIRATORY (INHALATION) EVERY 12 HOURS
Qty: 60 EACH | Refills: 11 | Status: SHIPPED | OUTPATIENT
Start: 2025-09-03

## 2025-09-03 RX ORDER — TAMSULOSIN HYDROCHLORIDE 0.4 MG/1
0.4 CAPSULE ORAL DAILY
Qty: 90 CAPSULE | Refills: 3 | Status: SHIPPED | OUTPATIENT
Start: 2025-09-03

## 2025-09-03 RX ORDER — ALBUTEROL SULFATE 90 UG/1
2 INHALANT RESPIRATORY (INHALATION) EVERY 6 HOURS PRN
Qty: 18 G | Refills: 2 | Status: SHIPPED | OUTPATIENT
Start: 2025-09-03

## 2025-09-03 ASSESSMENT — ENCOUNTER SYMPTOMS
RESPIRATORY NEGATIVE: 1
BACK PAIN: 1
GASTROINTESTINAL NEGATIVE: 1
EYES NEGATIVE: 1
ALLERGIC/IMMUNOLOGIC NEGATIVE: 1